# Patient Record
Sex: FEMALE | Race: WHITE | NOT HISPANIC OR LATINO | Employment: UNEMPLOYED | URBAN - METROPOLITAN AREA
[De-identification: names, ages, dates, MRNs, and addresses within clinical notes are randomized per-mention and may not be internally consistent; named-entity substitution may affect disease eponyms.]

---

## 2017-06-22 ENCOUNTER — TELEPHONE (OUTPATIENT)
Dept: INTERNAL MEDICINE | Facility: CLINIC | Age: 57
End: 2017-06-22

## 2017-06-24 ENCOUNTER — HEALTH MAINTENANCE LETTER (OUTPATIENT)
Age: 57
End: 2017-06-24

## 2017-10-03 ENCOUNTER — OFFICE VISIT (OUTPATIENT)
Dept: OBGYN | Facility: CLINIC | Age: 57
End: 2017-10-03
Payer: COMMERCIAL

## 2017-10-03 VITALS
HEIGHT: 67 IN | DIASTOLIC BLOOD PRESSURE: 80 MMHG | BODY MASS INDEX: 26.53 KG/M2 | SYSTOLIC BLOOD PRESSURE: 102 MMHG | WEIGHT: 169 LBS

## 2017-10-03 DIAGNOSIS — Z12.4 PAP SMEAR FOR CERVICAL CANCER SCREENING: ICD-10-CM

## 2017-10-03 DIAGNOSIS — Z11.51 SCREENING FOR HUMAN PAPILLOMAVIRUS: ICD-10-CM

## 2017-10-03 DIAGNOSIS — N93.9 VAGINAL SPOTTING: ICD-10-CM

## 2017-10-03 DIAGNOSIS — Z23 NEED FOR PROPHYLACTIC VACCINATION AND INOCULATION AGAINST INFLUENZA: ICD-10-CM

## 2017-10-03 DIAGNOSIS — Z01.419 WOMEN'S ANNUAL ROUTINE GYNECOLOGICAL EXAMINATION: Primary | ICD-10-CM

## 2017-10-03 DIAGNOSIS — Z13.29 SCREENING FOR THYROID DISORDER: ICD-10-CM

## 2017-10-03 LAB — TSH SERPL DL<=0.005 MIU/L-ACNC: 1.13 MU/L (ref 0.4–4)

## 2017-10-03 PROCEDURE — 84443 ASSAY THYROID STIM HORMONE: CPT | Performed by: ADVANCED PRACTICE MIDWIFE

## 2017-10-03 PROCEDURE — 99213 OFFICE O/P EST LOW 20 MIN: CPT | Mod: 25 | Performed by: ADVANCED PRACTICE MIDWIFE

## 2017-10-03 PROCEDURE — 36415 COLL VENOUS BLD VENIPUNCTURE: CPT | Performed by: ADVANCED PRACTICE MIDWIFE

## 2017-10-03 PROCEDURE — G0145 SCR C/V CYTO,THINLAYER,RESCR: HCPCS | Performed by: ADVANCED PRACTICE MIDWIFE

## 2017-10-03 PROCEDURE — 90686 IIV4 VACC NO PRSV 0.5 ML IM: CPT | Performed by: ADVANCED PRACTICE MIDWIFE

## 2017-10-03 PROCEDURE — 99386 PREV VISIT NEW AGE 40-64: CPT | Mod: 25 | Performed by: ADVANCED PRACTICE MIDWIFE

## 2017-10-03 PROCEDURE — 87624 HPV HI-RISK TYP POOLED RSLT: CPT | Performed by: ADVANCED PRACTICE MIDWIFE

## 2017-10-03 PROCEDURE — 90471 IMMUNIZATION ADMIN: CPT | Performed by: ADVANCED PRACTICE MIDWIFE

## 2017-10-03 NOTE — MR AVS SNAPSHOT
After Visit Summary   10/3/2017    Afua Ruby    MRN: [id number]           Patient Information     Date Of Birth          1960        Visit Information        Provider Department      10/3/2017 11:15 AM Piper Cabral CNM [facility]        Today's Diagnoses     Women's annual routine gynecological examination    -  1    Pap smear for cervical cancer screening        Screening for human papillomavirus        Screening for thyroid disorder        Need for prophylactic vaccination and inoculation against influenza        Vaginal spotting          Care Instructions    Preventive Health Recommendations  Female Ages over age 50      Yearly exam:     See your health care provider every year in order to:    1.Review health changes.  2. Discuss preventive care.  3. Review your medicines if your provider has prescribed any      Get a Pap test every five years with co-testing for HPV (unless you have an abnormal result and your provider advises testing more often)       You do not need a Pap test if your uterus was removed (hysterectomy) and you have not had cancer      You should be tested each year for STIs (sexually transmitted diseases) if you are at risk    Have a mammogram every year     Have a colonoscopy at age 50, or have a yearly FIT test (stool test). These exams screen for colon cancer.  Screening typically occurs every 10 years or more often if you have a family history or significant risk factors    Have a cholesterol test every 3-5 years, or more often if advised    Have a diabetes test (fasting glucose) every three years. If you are at risk for diabetes, you should have this test more often       Screening for thyroid disease and vitamin D deficiency are also beneficial every 3-5 years and as needed      If you are at risk for osteoporosis (brittle bone disease), think about having a bone density scan (DEXA)      You may experience perimenopausal symptoms such  as menstrual changes, hot flashes, night sweats, irritability, mood changes, vaginal dryness, and weight gain. Symptoms can be managed with lifestyle changes and/or medications for hormone replacement therapy (HRT). Talk with your provider about HRT if you are interested. You are considered menopausal after one year without having a menstrual cycle.        Shots:     Get a flu shot each year. Get a tetanus shot every 10 years.          Nutrition:       Eat at least 5 servings of fruits and vegetables each day      Eat REAL food, stay away from processed food      Eat whole-grain bread, whole-wheat pasta and brown rice instead of white grains and rice      For bone health:  Eat calcium-rich foods or take calcium pills up to 1200 mg. Also take vitamin D (2000 IUs) each day.     Lifestyle      Exercise at least 30 minutes a day, 5 days a week. This will help you control your weight and prevent disease.      Include weight bearing exercise into your exercise routine to help decrease your risk for osteoporosis      Limit alcohol to one drink per day.      No smoking      Wear sunscreen to prevent skin cancer      See your dentist every six months to one year for an exam and cleaning      See your eye doctor every 1 to 2 years            Follow-ups after your visit        Follow-up notes from your care team     Return in about 1 year (around 10/3/2018) for Physical Exam.      Future tests that were ordered for you today     Open Future Orders        Priority Expected Expires Ordered    US Pelvic Complete w Transvaginal Routine  10/3/2018 10/3/2017            Who to contact     If you have questions or need follow up information about today's clinic visit or your schedule please contact [facility] directly at [phone].  Normal or non-critical lab and imaging results will be communicated to you by MyChart, letter or phone within 4 business days after the clinic has received the results. If you  "do not hear from us within 7 days, please contact the clinic through [facility] or phone. If you have a critical or abnormal lab result, we will notify you by phone as soon as possible.  Submit refill requests through [facility] or call your pharmacy and they will forward the refill request to us. Please allow 3 business days for your refill to be completed.          Additional Information About Your Visit        [employer] Information     [facility] gives you secure access to your electronic health record. If you see a primary care provider, you can also send messages to your care team and make appointments. If you have questions, please call your primary care clinic.  If you do not have a primary care provider, please call [phone] and they will assist you.        Care EveryWhere ID     This is your Care EveryWhere ID. This could be used by other organizations to access your [place] medical records  [id number]        Your Vitals Were     Height Last Period BMI (Body Mass Index)             5' 7\" (1.702 m) 06/25/2007 26.47 kg/m2          Blood Pressure from Last 3 Encounters:   10/03/17 102/80   08/23/16 106/68   10/02/14 102/68    Weight from Last 3 Encounters:   10/03/17 169 lb (76.7 kg)   08/23/16 162 lb 1.6 oz (73.5 kg)   10/02/14 147 lb (66.7 kg)              We Performed the Following     FLU VAC, SPLIT VIRUS IM > 3 YO (QUADRIVALENT) [10460]     HPV High Risk Types DNA Cervical     PAP imaged thin layer screen     TSH with free T4 reflex     Vaccine Administration, Initial [[id number]]          Today's Medication Changes          These changes are accurate as of: 10/3/17 11:50 AM.  If you have any questions, ask your nurse or doctor.               Stop taking these medicines if you haven't already. Please contact your care team if you have questions.     ondansetron 4 MG tablet   Commonly known as:  ZOFRAN   Stopped by:  Piper Cabral CNM                    Primary Care Provider Office Phone # Fax #    Reji TREVINO " MD Nikki [phone]       [address] [facility] [address]        Equal Access to Services     FABIAN WHITEHEAD : Hadii aad ku hadmarkycarmen Sherrygricelda, yuegerda husseinarmaniha, yair karadha duffy, [place] kurtin hayaajarrell salasdustin madsen andrzej mcclendon. So [facility] [phone].    ATENCIÓN: Si habla español, tiene a montanez disposición servicios gratuitos de asistencia lingüística. Llame al [phone].    We comply with applicable federal civil rights laws and [place] laws. We do not discriminate on the basis of race, color, national origin, age, disability, sex, sexual orientation, or gender identity.            Thank you!     Thank you for choosing [facility]  for your care. Our goal is always to provide you with excellent care. Hearing back from our patients is one way we can continue to improve our services. Please take a few minutes to complete the written survey that you may receive in the mail after your visit with us. Thank you!             Your Updated Medication List - Protect others around you: Learn how to safely use, store and throw away your medicines at www.disposemymeds.org.          This list is accurate as of: 10/3/17 11:50 AM.  Always use your most recent med list.                   Brand Name Dispense Instructions for use Diagnosis    nicotine polacrilex 2 MG gum    [employer] NICOTINE POLACRILEX    240 tablet    Place 1 each (2 mg) inside cheek as needed for smoking cessation As directed by physician instructions    Tobacco use disorder

## 2017-10-03 NOTE — PROGRESS NOTES
Afua is a 57 year old  female who presents for annual exam.     Besides routine health maintenance,  she would like to discuss one episode of light pink spotting ~ 2 weeks ago.  HPI:  Afua presents today for her annual exam. She has no concerns other than one episode of light pink spotting when she wiped about two weeks ago. She has not had any vaginal bleeding or spotting since her LMP of 2007  Patient's last menstrual period was 2007..     REPRODUCTIVE/GYNECOLOGIC HISTORY:  Afua is not sexually active at the current time  STI testing offered?  Declined  History of abnormal Pap smear:  No  SOCIAL HISTORY  Abuse: does not report having previously been physical or sexually abused.    Do you feel safe in your environment? YES    She  reports that she quit smoking about 3 years ago. Her smoking use included Cigarettes. She has a 20.00 pack-year smoking history. She has never used smokeless tobacco.      Last PHQ-9 score on record = No flowsheet data found.  Last GAD7 score on record = No flowsheet data found.  Alcohol Score = socially    HEALTH MAINTENANCE:  Cholesterol: (  Cholesterol   Date Value Ref Range Status   2013 140 0 - 200 mg/dL Final     Comment:     LDL Cholesterol is the primary guide to therapy.   The NCEP recommends further evaluation of: patients with cholesterol greater   than 200 mg/dL if additional risk factors are present, cholesterol greater   than   240 mg/dL, triglycerides greater than 150 mg/dL, or HDL less than 40 mg/dL.   2011 137 0 - 200 mg/dL Final     Comment:     LDL Cholesterol is the primary guide to therapy.   The NCEP recommends further evaluation of: patients with cholesterol greater   than 200 mg/dL if additional risk factors are present, cholesterol greater   than   240 mg/dL, triglycerides greater than 150 mg/dL, or HDL less than 40 mg/dL.    History of abnormal lipids: No  Mammo: 2016 . History of abnormal Mammo: No, .  Regular Self  Breast Exams: No  TSH: (No results found for: TSH )  Pap; (  Lab Results   Component Value Date    PAP NIL 10/02/2013    PAP ATYP 2007    )  Immunizations up to date: yes  (Gardasil, Tdap, Flu)  Health maintenance updated:  yes    HEALTHY HABITS  Eating habits: follows a balanced nutrition diet  Calcium/Vitamin D intake: source:  dairy Adequate? 2-3 servings/day  Exercise: How often do you exercise? 3-5 times/week;Cardio  Have you had an eye exam in the last two years? NO (Recommended)  Do you routinely see the dentist once or twice yearly? YES      HISTORY:  Obstetric History       T2      L2     SAB0   TAB0   Ectopic0   Multiple0   Live Births0       # Outcome Date GA Lbr Quentin/2nd Weight Sex Delivery Anes PTL Lv   2 Term            1 Term                 Past Medical History:   Diagnosis Date     Iron deficiency anemia     due to menorrhagia     Menorrhagia      Tobacco abuse      Past Surgical History:   Procedure Laterality Date     NO HISTORY OF SURGERY      oral for molars     Family History   Problem Relation Age of Onset     Adopted: Yes     Unknown/Adopted Mother      adopted     Unknown/Adopted Father      adopted     Social History     Social History     Marital status:      Spouse name: N/A     Number of children: 2     Years of education: N/A     Occupational History      [employer] [place]     Social History Main Topics     Smoking status: Former Smoker     Packs/day: 0.80     Years: 25.00     Types: Cigarettes     Quit date: 1/15/2014     Smokeless tobacco: Never Used     Alcohol use 1.8 oz/week     3 Standard drinks or equivalent per week      Comment: social     Drug use: No     Sexual activity: No     Other Topics Concern     None     Social History Narrative       Current Outpatient Prescriptions:      nicotine polacrilex ([employer] NICOTINE POLACRILEX) 2 MG gum, Place 1 each (2 mg) inside cheek as needed for smoking cessation As directed by physician instructions,  "Disp: 240 tablet, Rfl: 8     Allergies   Allergen Reactions     Nkda [No Known Drug Allergies]        Past medical, surgical, social and family history were reviewed and updated in EPIC.    ROS:   C: NEGATIVE for fever, chills, change in weight  I: NEGATIVE for worrisome rashes, moles or lesions  E: NEGATIVE for vision changes or irritation  ENT: NEGATIVE for ear, mouth and throat problems  R: NEGATIVE for significant cough or SOB  B: NEGATIVE for masses, tenderness or discharge  CV: NEGATIVE for chest pain, palpitations or peripheral edema  GI: NEGATIVE for nausea, abdominal pain, heartburn, or change in bowel habits  : NEGATIVE for unusual urinary or vaginal symptoms. No vaginal bleeding.        Light pink spotting ~ 2 weeks ago x 1  M: NEGATIVE for significant arthralgias or myalgia  N: NEGATIVE for weakness, dizziness or paresthesias  E: NEGATIVE for temperature intolerance, skin/hair changes  H: NEGATIVE for bleeding problems  P: NEGATIVE for changes in mood or affect     PHYSICAL EXAM:  /80 (BP Location: Right arm, Patient Position: Chair, Cuff Size: Adult Regular)  Ht 5' 7\" (1.702 m)  Wt 169 lb (76.7 kg)  LMP 06/25/2007  BMI 26.47 kg/m2   BMI: Body mass index is 26.47 kg/(m^2).  Constitutional: healthy, alert and no distress  Neck: symmetrical, thyroid normal size, no masses present, no lymphadenopathy present.   Cardiovascular: RRR, no murmurs present  Respiratory: breathing unlabored, lungs CTA bilaterally  Breast:normal without masses, tenderness or nipple discharge and no palpable axillary masses or adenopathy  Gastrointestinal: abdomen soft, non-tender, bowel sounds present  PELVIC EXAM:  Vulva: No lesions, no adenopathy, BUS WNL  Vagina:  Dry, erythremic , discharge normal  well rugated, no lesions  Cervix:smooth, pink, no visible lesions  Uterus: Normal size, non-tender, mobile  Ovaries: No masses palpated  Rectal exam: deferred    ASSESSMENT/PLAN:      (Z01.419) Women's annual routine " gynecological examination  (primary encounter diagnosis)  Comment: Exam normal and appropriate for age  Plan: FLU VAC, SPLIT VIRUS IM > 3 YO (QUADRIVALENT)         [17893], Vaccine Administration, Initial         [69981]        Next annual exam due in one year.    (Z12.4) Pap smear for cervical cancer screening  Comment: Pending  Plan: PAP imaged thin layer screen, FLU VAC, SPLIT         VIRUS IM > 3 YO (QUADRIVALENT) [32355], Vaccine        Administration, Initial [28442]        Will advise patient of result when available. If normal next pap due in five years.    (Z11.51) Screening for human papillomavirus  Comment: pending  Plan: HPV High Risk Types DNA Cervical, FLU VAC,         SPLIT VIRUS IM > 3 YO (QUADRIVALENT) [40899],         Vaccine Administration, Initial [17472]        Will advise patient of result when available. If normal next HPV due in five years.        (Z13.29) Screening for thyroid disorder  Comment: pending  Plan: TSH with free T4 reflex, FLU VAC, SPLIT VIRUS         IM > 3 YO (QUADRIVALENT) [03170], Vaccine         Administration, Initial [76677]        Will advise patient of result when available. If normal next TSH due in five years.        (Z23) Need for prophylactic vaccination and inoculation against influenza  Comment: given  Plan: FLU VAC, SPLIT VIRUS IM > 3 YO (QUADRIVALENT)         [62357], Vaccine Administration, Initial         [65626]            (N92.0) Vaginal spotting  Comment: Most likely due to erosion of thin vaginal walls, but cannot rule out other pathology. Ultrasound ordered to assess endometrial lining   Plan: US Pelvic Complete w Transvaginal        Will advise patient of result when available. If normal will advise patient to continue to monitor and call if she has any further vaginal bleeding.        Results for orders placed or performed in visit on 10/28/16   *MA Screening Digital Bilateral    Narrative    SCREENING MAMMOGRAM, BILATERAL, DIGITAL w/CAD - 10/28/2016  11:05 AM    BREAST SYMPTOMS: No current breast complaints.     COMPARISON:  10/02/2015, 10/09/2014, 09/09/2013.    BREAST DENSITY: Scattered fibroglandular densities.    COMMENTS: No findings of suspicion for malignancy.       Impression    IMPRESSION: BI-RADS CATEGORY: 1 -  Negative    RECOMMENDED FOLLOW-UP: Annual Mammography.    Exam results letter mailed to patient.      MYKEL DUKE MD         COUNSELING:   Reviewed preventive health counseling, as reflected in patient instructions       Regular exercise       Healthy diet/nutrition   reports that she quit smoking about 3 years ago. Her smoking use included Cigarettes. She has a 20.00 pack-year smoking history. She has never used smokeless tobacco.     JERICA Willis, JERICA Yang, JOVANI

## 2017-10-03 NOTE — PROGRESS NOTES
Injectable Influenza Immunization Documentation    1.  Is the person to be vaccinated sick today?   No    2. Does the person to be vaccinated have an allergy to a component   of the vaccine?   No    3. Has the person to be vaccinated ever had a serious reaction   to influenza vaccine in the past?   No    4. Has the person to be vaccinated ever had Guillain-Barré syndrome?   No    Form completed by MOSHE Da Silva LPN

## 2017-10-03 NOTE — NURSING NOTE
"Chief Complaint   Patient presents with     Gyn Exam     pap is due       Initial /80 (BP Location: Right arm, Patient Position: Chair, Cuff Size: Adult Regular)  Ht 5' 7\" (1.702 m)  Wt 169 lb (76.7 kg)  LMP 2007  BMI 26.47 kg/m2 Estimated body mass index is 26.47 kg/(m^2) as calculated from the following:    Height as of this encounter: 5' 7\" (1.702 m).    Weight as of this encounter: 169 lb (76.7 kg).  BP completed using cuff size: regular        The following HM Due: pap smear  Lab Results   Component Value Date    PAP NIL 10/02/2013         The following patient reported/Care Every where data was sent to:  P ABSTRACT QUALITY INITIATIVES [82643]       patient has appointment for today             "

## 2017-10-05 LAB
COPATH REPORT: NORMAL
PAP: NORMAL

## 2017-10-09 LAB
FINAL DIAGNOSIS: NORMAL
HPV HR 12 DNA CVX QL NAA+PROBE: NEGATIVE
HPV16 DNA SPEC QL NAA+PROBE: NEGATIVE
HPV18 DNA SPEC QL NAA+PROBE: NEGATIVE
SPECIMEN DESCRIPTION: NORMAL

## 2017-10-25 ENCOUNTER — OFFICE VISIT (OUTPATIENT)
Dept: URGENT CARE | Facility: URGENT CARE | Age: 57
End: 2017-10-25
Payer: COMMERCIAL

## 2017-10-25 VITALS
HEART RATE: 76 BPM | SYSTOLIC BLOOD PRESSURE: 119 MMHG | TEMPERATURE: 97.3 F | WEIGHT: 170.31 LBS | BODY MASS INDEX: 26.67 KG/M2 | DIASTOLIC BLOOD PRESSURE: 79 MMHG

## 2017-10-25 DIAGNOSIS — R30.0 DYSURIA: Primary | ICD-10-CM

## 2017-10-25 DIAGNOSIS — N30.01 ACUTE CYSTITIS WITH HEMATURIA: ICD-10-CM

## 2017-10-25 LAB
ALBUMIN UR-MCNC: NEGATIVE MG/DL
APPEARANCE UR: CLEAR
BILIRUB UR QL STRIP: NEGATIVE
COLOR UR AUTO: YELLOW
GLUCOSE UR STRIP-MCNC: NEGATIVE MG/DL
HGB UR QL STRIP: ABNORMAL
KETONES UR STRIP-MCNC: NEGATIVE MG/DL
LEUKOCYTE ESTERASE UR QL STRIP: ABNORMAL
NITRATE UR QL: NEGATIVE
PH UR STRIP: 5.5 PH (ref 5–7)
RBC #/AREA URNS AUTO: >100 /HPF
SOURCE: ABNORMAL
SP GR UR STRIP: 1.01 (ref 1–1.03)
UROBILINOGEN UR STRIP-ACNC: 0.2 EU/DL (ref 0.2–1)
WBC #/AREA URNS AUTO: ABNORMAL /HPF

## 2017-10-25 PROCEDURE — 99213 OFFICE O/P EST LOW 20 MIN: CPT | Performed by: PHYSICIAN ASSISTANT

## 2017-10-25 PROCEDURE — 81001 URINALYSIS AUTO W/SCOPE: CPT | Performed by: PHYSICIAN ASSISTANT

## 2017-10-25 RX ORDER — NITROFURANTOIN 25; 75 MG/1; MG/1
100 CAPSULE ORAL 2 TIMES DAILY
Qty: 14 CAPSULE | Refills: 0 | Status: SHIPPED | OUTPATIENT
Start: 2017-10-25 | End: 2018-07-26

## 2017-10-25 NOTE — PATIENT INSTRUCTIONS
1. Start antibiotic today.  2. Increase fluids to help flush urinary tract.    3. Take ibuprofen (600mg every 6 hours with food or water) or tylenol (500mg every 6 hours) for pain.   4. OTC urinary pain reliever Azo/Uristat can be used to prevent discomfort. It changes the color of the urine (usually to orange or red) and may stain fabric. Do not use for longer than 48 hours since symptoms should have cleared by this time once antibiotics have been started.  If you are still having severe symptoms, you need to follow up with the clinic.  5. When culture results return, we will call and change your medication if needed.  Follow up immediately if you start to have high fever, severe back pain, nausea or vomiting.  To prevent future infections:  Drink plenty of water.   Do not delay urinating when you feel the need to urinate.   Keep the vaginal area clean. Wipe from front to back after using the toilet. Be sure to gently wash the genital area each time you bathe or shower. However, use soap only on the outside of your vagina. The chemicals in soap may cause additional irritation.   Urinate after intercourse. Never combine anal and vaginal intercourse.   Wear cotton underwear, which allows better air circulation than nylon. Wear pantyhose with cotton crotches.   Avoid tight clothes in the genital area, such as control-top pantyhose and tight jeans. Do not wear a wet bathing suit for long periods of time.

## 2017-10-25 NOTE — PROGRESS NOTES
SUBJECTIVE:   Afua Ruby is a 57 year old female who presents to clinic today for the following health issues:      URINARY TRACT SYMPTOMS  Onset: yesterday    Description:   Painful urination (Dysuria): YES  Blood in urine (Hematuria): YES  Delay in urine (Hesitency): no     Intensity: moderate    Progression of Symptoms:  worsening    Accompanying Signs & Symptoms:  Fever/chills: no   Flank pain no   Nausea and vomiting: no   Any vaginal symptoms: none  Abdominal/Pelvic Pain: no     History:   History of frequent UTI's: no   History of kidney stones: no   Sexually Active: YES  Possibility of pregnancy: No    Precipitating factors:   none    Therapies Tried and outcome: Cranberry juice seemed to help      Problem list and histories reviewed & adjusted, as indicated.  Additional history: as documented    Patient Active Problem List   Diagnosis     CARDIOVASCULAR SCREENING; LDL GOAL LESS THAN 130     Past Surgical History:   Procedure Laterality Date     NO HISTORY OF SURGERY      oral for molars       Social History   Substance Use Topics     Smoking status: Former Smoker     Packs/day: 0.80     Years: 25.00     Types: Cigarettes     Quit date: 1/15/2014     Smokeless tobacco: Never Used     Alcohol use 1.8 oz/week     3 Standard drinks or equivalent per week      Comment: social     Family History   Problem Relation Age of Onset     Adopted: Yes     Unknown/Adopted Mother      adopted     Unknown/Adopted Father      adopted         Current Outpatient Prescriptions   Medication Sig Dispense Refill     nitroFURantoin, macrocrystal-monohydrate, (MACROBID) 100 MG capsule Take 1 capsule (100 mg) by mouth 2 times daily 14 capsule 0     nicotine polacrilex ([employer] NICOTINE POLACRILEX) 2 MG gum Place 1 each (2 mg) inside cheek as needed for smoking cessation As directed by physician instructions 240 tablet 8         Reviewed and updated as needed this visit by clinical staffTobacco  Allergies  Meds       Reviewed and  updated as needed this visit by Provider         ROS:  Constitutional, HEENT, cardiovascular, pulmonary, gi and gu systems are negative, except as otherwise noted.      OBJECTIVE:   /79  Pulse 76  Temp 97.3  F (36.3  C) (Oral)  Wt 170 lb 5 oz (77.3 kg)  LMP 06/25/2007  Breastfeeding? No  BMI 26.67 kg/m2  Body mass index is 26.67 kg/(m^2).  GENERAL: healthy, alert and no distress  HENT: oral mucous membranes moist  RESP: lungs clear to auscultation - no rales, rhonchi or wheezes  CV: regular rate and rhythm, normal S1 S2, no S3 or S4, no murmur, click or rub, no peripheral edema and peripheral pulses strong  ABDOMEN: soft, nontender, no hepatosplenomegaly, no masses and bowel sounds normal  SKIN: no suspicious lesions or rashes  BACK: no CVA tenderness, no paralumbar tenderness  PSYCH: mentation appears normal, affect normal/bright    Diagnostic Test Results:  Results for orders placed or performed in visit on 10/25/17 (from the past 24 hour(s))   UA with Microscopic reflex to Culture   Result Value Ref Range    Color Urine Yellow     Appearance Urine Clear     Glucose Urine Negative NEG^Negative mg/dL    Bilirubin Urine Negative NEG^Negative    Ketones Urine Negative NEG^Negative mg/dL    Specific Gravity Urine 1.015 1.003 - 1.035    pH Urine 5.5 5.0 - 7.0 pH    Protein Albumin Urine Negative NEG^Negative mg/dL    Urobilinogen Urine 0.2 0.2 - 1.0 EU/dL    Nitrite Urine Negative NEG^Negative    Blood Urine Large (A) NEG^Negative    Leukocyte Esterase Urine Trace (A) NEG^Negative    Source Midstream Urine     WBC Urine 2-5 (A) OTO2^O - 2 /HPF    RBC Urine >100 (A) OTO2^O - 2 /HPF       ASSESSMENT/PLAN:         ICD-10-CM    1. Dysuria R30.0 UA with Microscopic reflex to Culture   2. Acute cystitis with hematuria N30.01 nitroFURantoin, macrocrystal-monohydrate, (MACROBID) 100 MG capsule     Discussed medication appropriate use, potential side effects and anticipated benefit  Of use of nitrofurantoin.   F/u  with pcp if not improved as anticipated.     Patient Instructions   1. Start antibiotic today.  2. Increase fluids to help flush urinary tract.    3. Take ibuprofen (600mg every 6 hours with food or water) or tylenol (500mg every 6 hours) for pain.   4. OTC urinary pain reliever Azo/Uristat can be used to prevent discomfort. It changes the color of the urine (usually to orange or red) and may stain fabric. Do not use for longer than 48 hours since symptoms should have cleared by this time once antibiotics have been started.  If you are still having severe symptoms, you need to follow up with the clinic.  5. When culture results return, we will call and change your medication if needed.  Follow up immediately if you start to have high fever, severe back pain, nausea or vomiting.  To prevent future infections:  Drink plenty of water.   Do not delay urinating when you feel the need to urinate.   Keep the vaginal area clean. Wipe from front to back after using the toilet. Be sure to gently wash the genital area each time you bathe or shower. However, use soap only on the outside of your vagina. The chemicals in soap may cause additional irritation.   Urinate after intercourse. Never combine anal and vaginal intercourse.   Wear cotton underwear, which allows better air circulation than nylon. Wear pantyhose with cotton crotches.   Avoid tight clothes in the genital area, such as control-top pantyhose and tight jeans. Do not wear a wet bathing suit for long periods of time.             Nicole Joy Siegler, PA-C  [facility]

## 2017-10-25 NOTE — NURSING NOTE
"Chief Complaint   Patient presents with     UTI     burning pain when urinating, blood in urine and urinary frequency for two days.        Initial /79  Pulse 76  Temp 97.3  F (36.3  C) (Oral)  Wt 170 lb 5 oz (77.3 kg)  LMP 06/25/2007  Breastfeeding? No  BMI 26.67 kg/m2 Estimated body mass index is 26.67 kg/(m^2) as calculated from the following:    Height as of 10/3/17: 5' 7\" (1.702 m).    Weight as of this encounter: 170 lb 5 oz (77.3 kg).  Medication Reconciliation: complete    "

## 2017-10-25 NOTE — MR AVS SNAPSHOT
After Visit Summary   10/25/2017    Afua Ruby    MRN: [id number]           Patient Information     Date Of Birth          1960        Visit Information        Provider Department      10/25/2017 5:30 PM Siegler, Nicole Joy, PA-C [facility]        Today's Diagnoses     Dysuria    -  1      Care Instructions    1. Start antibiotic today.  2. Increase fluids to help flush urinary tract.    3. Take ibuprofen (600mg every 6 hours with food or water) or tylenol (500mg every 6 hours) for pain.   4. OTC urinary pain reliever Azo/Uristat can be used to prevent discomfort. It changes the color of the urine (usually to orange or red) and may stain fabric. Do not use for longer than 48 hours since symptoms should have cleared by this time once antibiotics have been started.  If you are still having severe symptoms, you need to follow up with the clinic.  5. When culture results return, we will call and change your medication if needed.  Follow up immediately if you start to have high fever, severe back pain, nausea or vomiting.  To prevent future infections:  Drink plenty of water.   Do not delay urinating when you feel the need to urinate.   Keep the vaginal area clean. Wipe from front to back after using the toilet. Be sure to gently wash the genital area each time you bathe or shower. However, use soap only on the outside of your vagina. The chemicals in soap may cause additional irritation.   Urinate after intercourse. Never combine anal and vaginal intercourse.   Wear cotton underwear, which allows better air circulation than nylon. Wear pantyhose with cotton crotches.   Avoid tight clothes in the genital area, such as control-top pantyhose and tight jeans. Do not wear a wet bathing suit for long periods of time.                 Follow-ups after your visit        Your next 10 appointments already scheduled     Nov 02, 2017 10:30 AM CDT   US PELVIC COMPLETE W TRANSVAGINAL  with OXUS1   [facility]    [address] [address] [phone]           Please bring a list of your medicines (including vitamins, minerals and over-the-counter drugs). Also, tell your doctor about any allergies you may have. Wear comfortable clothes and leave your valuables at home.  Adults: Drink six 8-ounce glasses of fluid one hour before your exam. Do NOT empty your bladder.  If you need to empty your bladder before your exam, try to release only a little bit of urine. Then, drink another 8oz glass of fluid.  Children: Children who are potty trained should drink at least 4 cups (32 oz) of liquid 45 minutes to one hour prior to the exam. The child s bladder must be full in order to achieve a diagnostic exam. If your child is very uncomfortable or has an urgent need to pee, please notify a technologist; they will try to find out how much longer the wait may be and provide instructions to help relieve the pressure. Occasionally it is medically necessary to insert a urinary catheter to fill the bladder.  Please call the Imaging Department at your exam site with any questions.              Who to contact     If you have questions or need follow up information about today's clinic visit or your schedule please contact [facility] directly at [phone].  Normal or non-critical lab and imaging results will be communicated to you by MyChart, letter or phone within 4 business days after the clinic has received the results. If you do not hear from us within 7 days, please contact the clinic through MyChart or phone. If you have a critical or abnormal lab result, we will notify you by phone as soon as possible.  Submit refill requests through [employer] or call your pharmacy and they will forward the refill request to us. Please allow 3 business days for your refill to be completed.          Additional  Information About Your Visit        [employer] Information     [employer] gives you secure access to your electronic health record. If you see a primary care provider, you can also send messages to your care team and make appointments. If you have questions, please call your primary care clinic.  If you do not have a primary care provider, please call [phone] and they will assist you.        Care EveryWhere ID     This is your Care EveryWhere ID. This could be used by other organizations to access your [facility] medical records  [id number]        Your Vitals Were     Pulse Temperature Last Period Breastfeeding? BMI (Body Mass Index)       76 97.3  F (36.3  C) (Oral) 06/25/2007 No 26.67 kg/m2        Blood Pressure from Last 3 Encounters:   10/25/17 119/79   10/03/17 102/80   08/23/16 106/68    Weight from Last 3 Encounters:   10/25/17 170 lb 5 oz (77.3 kg)   10/03/17 169 lb (76.7 kg)   08/23/16 162 lb 1.6 oz (73.5 kg)              We Performed the Following     UA with Microscopic reflex to Culture        Primary Care Provider Office Phone # Fax #    Reji Jordan [phone]       [address] [facility] [address]        Equal Access to Services     FABIAN WHITEHEAD : Hadii monse ku hadasho Soomaali, waaxda luqadaha, qaybta kaalmada adeegyada, phoenix mcclendon. So [facility] [phone].    ATENCIÓN: Si habla español, tiene a montanez disposición servicios gratPathway Lendingos de asistencia lingüística. Llame al [phone].    We comply with applicable federal civil rights laws and [place] laws. We do not discriminate on the basis of race, color, national origin, age, disability, sex, sexual orientation, or gender identity.            Thank you!     Thank you for choosing [facility]  for your care. Our goal is always to provide you with excellent care. Hearing back from our patients is one way we can continue to improve our services. Please take a few  minutes to complete the written survey that you may receive in the mail after your visit with us. Thank you!             Your Updated Medication List - Protect others around you: Learn how to safely use, store and throw away your medicines at www.disposemymeds.org.          This list is accurate as of: 10/25/17  6:30 PM.  Always use your most recent med list.                   Brand Name Dispense Instructions for use Diagnosis    nicotine polacrilex 2 MG gum    [employer]S NICOTINE POLACRILEX    240 tablet    Place 1 each (2 mg) inside cheek as needed for smoking cessation As directed by physician instructions    Tobacco use disorder

## 2017-12-30 ENCOUNTER — HEALTH MAINTENANCE LETTER (OUTPATIENT)
Age: 57
End: 2017-12-30

## 2018-07-26 ENCOUNTER — OFFICE VISIT (OUTPATIENT)
Dept: INTERNAL MEDICINE | Facility: CLINIC | Age: 58
End: 2018-07-26
Payer: COMMERCIAL

## 2018-07-26 VITALS
TEMPERATURE: 98.1 F | HEIGHT: 67 IN | SYSTOLIC BLOOD PRESSURE: 114 MMHG | OXYGEN SATURATION: 98 % | DIASTOLIC BLOOD PRESSURE: 68 MMHG | WEIGHT: 169.7 LBS | HEART RATE: 87 BPM | BODY MASS INDEX: 26.63 KG/M2 | RESPIRATION RATE: 16 BRPM

## 2018-07-26 DIAGNOSIS — M54.50 ACUTE MIDLINE LOW BACK PAIN WITHOUT SCIATICA: Primary | ICD-10-CM

## 2018-07-26 PROCEDURE — 99213 OFFICE O/P EST LOW 20 MIN: CPT | Performed by: INTERNAL MEDICINE

## 2018-07-26 NOTE — PROGRESS NOTES
"  SUBJECTIVE:   Afua Ruby is a 58 year old female who presents to clinic today for the following health issues:    Back Pain       Duration: 5 days        Specific cause: does a lot of lifting     Description:   Location of pain: low back both  Character of pain: dull ache  Pain radiation:none  New numbness or weakness in legs, not attributed to pain:  no     Intensity: mild    History:   Pain interferes with job: Yes  History of back problems: no prior back problems  Any previous MRI or X-rays: None  Sees a specialist for back pain:  No  Therapies tried without relief: NSAIDs    Alleviating factors:   Improved by: NSAIDs      Precipitating factors:  Worsened by: Nothing    Functional and Psychosocial Screen (Raymundo STarT Back):      Not performed today          Accompanying Signs & Symptoms:  Risk of Fracture:  None  Risk of Cauda Equina:  None  Risk of Infection:  None  Risk of Cancer:  None  Risk of Ankylosing Spondylitis:  Onset at age <35, male, AND morning back stiffness. no       Problem list and histories reviewed & adjusted, as indicated.  Additional history: as documented    Labs reviewed in EPIC    Reviewed and updated as needed this visit by clinical staff  Allergies       Reviewed and updated as needed this visit by Provider         ROS:  Constitutional, HEENT, cardiovascular, pulmonary, gi and gu systems are negative, except as otherwise noted.    OBJECTIVE:                                                    /68  Pulse 87  Temp 98.1  F (36.7  C) (Oral)  Resp 16  Ht 5' 7\" (1.702 m)  Wt 169 lb 11.2 oz (77 kg)  LMP 06/25/2007  SpO2 98%  BMI 26.58 kg/m2  Body mass index is 26.58 kg/(m^2).  GENERAL APPEARANCE: healthy, alert and no distress  RESP: lungs clear to auscultation - no rales, rhonchi or wheezes  CV: regular rates and rhythm, normal S1 S2, no S3 or S4 and no murmur, click or rub  MS: extremities normal- no gross deformities noted  Comprehensive back pain exam:  No tenderness, " Range of motion not limited by pain, Lower extremity strength functional and equal on both sides, Lower extremity reflexes within normal limits bilaterally, Lower extremity sensation normal and equal on both sides and Straight leg raise negative bilaterally    Diagnostic test results:  none      ASSESSMENT/PLAN:                                                    1. Acute midline low back pain without sciatica  Appears musculoskeletal.  Related to lifting injury at work.  General she is improving significantly and we discussed avoidance of future injuries but little else is needed at this time.            Reji Jordan MD  [facility]

## 2018-07-26 NOTE — PATIENT INSTRUCTIONS
When You Have Low Back Pain    Caring for Your Back  You are not alone.    Low back pain is very common. Nearly half of all adults have low back pain in any given year. The good news is that back pain is rarely a danger to your health. Most people can manage their back pain on their own and about half of them start feeling better within 2 weeks. In 9 out of 10 cases, low back pain goes away or no longer limits daily activity within 6 weeks.     Your outlook is good!    Your symptoms tell us that your low back pain is most likely not a danger to you. Most of the time we do not know the exact cause of low back pain, even if you see a doctor or have an MRI. However, treatment can still work without knowing the cause of the pain. Less than 1 in 100 people need surgery for their back pain.     What can I do about my low back pain?     There are three things you can do to ease low back pain and help it go away.    Use heat or cold packs.    Take medicine as directed.    Use positions, movements and exercises.     Using heat or cold packs    Try cold packs or gentle heat to ease your pain. Use whichever gives you the most relief. Apply the cold pack or heat for 15 minutes at a time, as often as needed.    Taking medicine      If your doctor has prescribed medicine, be sure to follow the directions.    If you take over-the-counter medicine, read and follow the directions.    Talk to your doctor if you have any questions.     Using positions, movements and exercises    Research tells us that moving your joints and muscles can help you recover from back pain. Such activity should be simple and gentle. Use the positions in the photos as well as walking to help relieve your pain. Try taking a short walk every 3 to 4 hours during the day. Walk for a few minutes inside your home or take longer walks outside, on a treadmill or at a mall. Slowly increase the amount of time you walk. Expect discomfort when you begin, but it should  lessen as your back starts to heal. When your back feels better, walk daily to keep your back and body healthy.    Finding a comfortable position    When your back pain is new, certain positions will ease your pain. Gently try each of the positions below until you find one that is helpful. Once you find a position of comfort, use it as often as you like when you are resting. You will recover faster if you combine rest with activity.         Lie on your back with your legs bent. You can do this by placing a pillow under your knees. Or you may lie on the floor and rest your lower legs on the seat of a chair.       Lie on your side with your knees bent, and place a pillow between your knees.       Lie on your stomach over pillows.      When should I call my doctor?    Your back pain should improve over the first couple of weeks. As it improves, you should be able to return to your normal activities. But call your doctor if:    You have a sudden change in your ability to control your bladder or bowels.    You feel tingling in your groin or legs.    The pain spreads down your leg and into your foot.    Your toes, feet or leg muscles feel weak.    You feel generally unwell or sick.    Your pain does not get better or gets worse.      If you are deaf or hard of hearing, please let us know. We provide many free services including sign language interpreters,oral interpreters, TTYs, telephone amplifiers, note takers and written materials.    For informational purposes only. Not to replace the advice of your health care provider. Copyright   2013 [facility]. All rights reserved. [employer] [id number] - Rev 06/14.

## 2018-07-26 NOTE — MR AVS SNAPSHOT
After Visit Summary   7/26/2018    Afua Ruby    MRN: [id number]           Patient Information     Date Of Birth          1960        Visit Information        Provider Department      7/26/2018 3:00 PM Reji Jordan MD [facility]        Today's Diagnoses     Acute midline low back pain without sciatica    -  1      Care Instructions    When You Have Low Back Pain    Caring for Your Back  You are not alone.    Low back pain is very common. Nearly half of all adults have low back pain in any given year. The good news is that back pain is rarely a danger to your health. Most people can manage their back pain on their own and about half of them start feeling better within 2 weeks. In 9 out of 10 cases, low back pain goes away or no longer limits daily activity within 6 weeks.     Your outlook is good!    Your symptoms tell us that your low back pain is most likely not a danger to you. Most of the time we do not know the exact cause of low back pain, even if you see a doctor or have an MRI. However, treatment can still work without knowing the cause of the pain. Less than 1 in 100 people need surgery for their back pain.     What can I do about my low back pain?     There are three things you can do to ease low back pain and help it go away.    Use heat or cold packs.    Take medicine as directed.    Use positions, movements and exercises.     Using heat or cold packs    Try cold packs or gentle heat to ease your pain. Use whichever gives you the most relief. Apply the cold pack or heat for 15 minutes at a time, as often as needed.    Taking medicine      If your doctor has prescribed medicine, be sure to follow the directions.    If you take over-the-counter medicine, read and follow the directions.    Talk to your doctor if you have any questions.     Using positions, movements and exercises    Research tells us that moving your joints and muscles can help you  recover from back pain. Such activity should be simple and gentle. Use the positions in the photos as well as walking to help relieve your pain. Try taking a short walk every 3 to 4 hours during the day. Walk for a few minutes inside your home or take longer walks outside, on a treadmill or at a mall. Slowly increase the amount of time you walk. Expect discomfort when you begin, but it should lessen as your back starts to heal. When your back feels better, walk daily to keep your back and body healthy.    Finding a comfortable position    When your back pain is new, certain positions will ease your pain. Gently try each of the positions below until you find one that is helpful. Once you find a position of comfort, use it as often as you like when you are resting. You will recover faster if you combine rest with activity.         Lie on your back with your legs bent. You can do this by placing a pillow under your knees. Or you may lie on the floor and rest your lower legs on the seat of a chair.       Lie on your side with your knees bent, and place a pillow between your knees.       Lie on your stomach over pillows.      When should I call my doctor?    Your back pain should improve over the first couple of weeks. As it improves, you should be able to return to your normal activities. But call your doctor if:    You have a sudden change in your ability to control your bladder or bowels.    You feel tingling in your groin or legs.    The pain spreads down your leg and into your foot.    Your toes, feet or leg muscles feel weak.    You feel generally unwell or sick.    Your pain does not get better or gets worse.      If you are deaf or hard of hearing, please let us know. We provide many free services including sign language interpreters,oral interpreters, TTYs, telephone amplifiers, note takers and written materials.    For informational purposes only. Not to replace the advice of your health care provider.  "Copyright   2013 [facility]. All rights reserved. [employer] 622701 - Rev 06/14.            Follow-ups after your visit        Who to contact     If you have questions or need follow up information about today's clinic visit or your schedule please contact [facility] directly at [phone].  Normal or non-critical lab and imaging results will be communicated to you by MyChart, letter or phone within 4 business days after the clinic has received the results. If you do not hear from us within 7 days, please contact the clinic through [employer] or phone. If you have a critical or abnormal lab result, we will notify you by phone as soon as possible.  Submit refill requests through [facility] [employer] or call your pharmacy and they will forward the refill request to us. Please allow 3 business days for your refill to be completed.          Additional Information About Your Visit        MyChart Information     [facility] [employer] gives you secure access to your electronic health record. If you see a primary care provider, you can also send messages to your care team and make appointments. If you have questions, please call your primary care clinic.  If you do not have a primary care provider, please call [phone] and they will assist you.        Care EveryWhere ID     This is your Care EveryWhere ID. This could be used by other organizations to access your [place] medical records  [id number]        Your Vitals Were     Pulse Temperature Respirations Height Last Period Pulse Oximetry    87 98.1  F (36.7  C) (Oral) 16 5' 7\" (1.702 m) 06/25/2007 98%    BMI (Body Mass Index)                   26.58 kg/m2            Blood Pressure from Last 3 Encounters:   07/26/18 114/68   10/25/17 119/79   10/03/17 102/80    Weight from Last 3 Encounters:   07/26/18 169 lb 11.2 oz (77 kg)   10/25/17 170 lb 5 oz (77.3 kg)   10/03/17 169 lb (76.7 kg)              Today, you had the following     No orders found for " display       Primary Care Provider Office Phone # Fax #    Reji Jordan [phone]       [address] [facility] [address]        Equal Access to Services     AFBIAN WHITEHEAD : Sara alarcno marcuso Soomaali, waaxda luqadaha, qaybta kaalmada adeegyada, phoenix ennis josuedustin madsen andrzej mcclendon. So [facility] [phone].    ATENCIÓN: Si habla español, tiene a montanez disposición servicios gratuitos de asistencia lingüística. Llame al [phone].    We comply with applicable federal civil rights laws and [place] laws. We do not discriminate on the basis of race, color, national origin, age, disability, sex, sexual orientation, or gender identity.            Thank you!     Thank you for choosing [facility]  for your care. Our goal is always to provide you with excellent care. Hearing back from our patients is one way we can continue to improve our services. Please take a few minutes to complete the written survey that you may receive in the mail after your visit with us. Thank you!             Your Updated Medication List - Protect others around you: Learn how to safely use, store and throw away your medicines at www.disposemymeds.org.          This list is accurate as of 7/26/18  3:45 PM.  Always use your most recent med list.                   Brand Name Dispense Instructions for use Diagnosis    nicotine polacrilex 2 MG gum    [employer] NICOTINE POLACRILEX    240 tablet    Place 1 each (2 mg) inside cheek as needed for smoking cessation As directed by physician instructions    Tobacco use disorder

## 2018-09-19 ENCOUNTER — MYC MEDICAL ADVICE (OUTPATIENT)
Dept: INTERNAL MEDICINE | Facility: CLINIC | Age: 58
End: 2018-09-19

## 2018-09-28 ENCOUNTER — MYC MEDICAL ADVICE (OUTPATIENT)
Dept: INTERNAL MEDICINE | Facility: CLINIC | Age: 58
End: 2018-09-28

## 2018-11-10 ENCOUNTER — HEALTH MAINTENANCE LETTER (OUTPATIENT)
Age: 58
End: 2018-11-10

## 2019-02-15 ENCOUNTER — HEALTH MAINTENANCE LETTER (OUTPATIENT)
Age: 59
End: 2019-02-15

## 2019-04-15 ENCOUNTER — ALLIED HEALTH/NURSE VISIT (OUTPATIENT)
Dept: NURSING | Facility: CLINIC | Age: 59
End: 2019-04-15
Payer: COMMERCIAL

## 2019-04-15 DIAGNOSIS — Z23 NEED FOR SHINGLES VACCINE: Primary | ICD-10-CM

## 2019-04-15 PROCEDURE — 90471 IMMUNIZATION ADMIN: CPT

## 2019-04-15 PROCEDURE — 90750 HZV VACC RECOMBINANT IM: CPT

## 2019-04-15 NOTE — PROGRESS NOTES
Screening Questionnaire for Adult Immunization    Are you sick today?   No   Do you have allergies to medications, food, a vaccine component or latex?   No   Have you ever had a serious reaction after receiving a vaccination?   No   Do you have a long-term health problem with heart disease, lung disease, asthma, kidney disease, metabolic disease (e.g. diabetes), anemia, or other blood disorder?   No   Do you have cancer, leukemia, HIV/AIDS, or any other immune system problem?   No   In the past 3 months, have you taken medications that affect  your immune system, such as prednisone, other steroids, or anticancer drugs; drugs for the treatment of rheumatoid arthritis, Crohn s disease, or psoriasis; or have you had radiation treatments?   No   Have you had a seizure, or a brain or other nervous system problem?   No   During the past year, have you received a transfusion of blood or blood     products, or been given immune (gamma) globulin or antiviral drug?   No   For women: Are you pregnant or is there a chance you could become        pregnant during the next month?   No   Have you received any vaccinations in the past 4 weeks?   No     Immunization questionnaire answers were all negative.        Per orders of Dr. Jordan, injection of Shingrix given by Tracey Friend. Patient instructed to remain in clinic for 15 minutes afterwards, and to report any adverse reaction to me immediately.       Screening performed by Tracey Friend on 4/15/2019 at 1:04 PM.

## 2019-04-26 ENCOUNTER — TELEPHONE (OUTPATIENT)
Dept: INTERNAL MEDICINE | Facility: CLINIC | Age: 59
End: 2019-04-26

## 2019-04-26 DIAGNOSIS — Z12.31 VISIT FOR SCREENING MAMMOGRAM: Primary | ICD-10-CM

## 2019-04-26 NOTE — LETTER
[facility]  [address] [address]  [phone]  April 26, 2019    Afua Ruby  [address] [facility] [address]            Dear Afua,    We care about your health and based on a review of your medical records, recommend the the following, to better manage your health:      You are in particular need of attention regarding:  -Breast Cancer Screening  -Cervical Cancer Screening  -Wellness (Physical) Visit     I am recommending that you:     -schedule a WELLNESS (Physical) APPOINTMENT with me.   I will check fasting labs the same day - nothing to eat except water and meds for 8-10 hours prior.    -schedule a MAMMOGRAM which is due.    1 in 8 women will develop invasive breast cancer during her lifetime and it is the most common non-skin cancer in American women.  EARLY detection, new treatments, and a better understanding of the disease have increased survival rates - the 5 year survival rate in the 1960s was 63% and today it is close to 90%.    If you are under/uninsured, we recommend you contact the [facility]. They offer mammograms at no charge or on a sliding fee charge. You can schedule with them at [phone]. Please have them send us the results.      Please disregard this reminder if you have had this exam elsewhere within the last year.  It would be helpful for us to have a copy of your mammogram report in your file so that we can best coordinate your care - please contact us with when your test was done so we can update your record.             -schedule a PAP SMEAR EXAM which is due.  Please disregard this reminder if you have had this exam elsewhere within the last year.  It would be helpful for us to have a copy of your recent pap smear report in our file so that we can best coordinate your care.    If you are under/uninsured, we recommend you contact the [facility]. They offer pap smears at no charge or on a sliding  fee charge. You can schedule with them at [phone]. Please have them send us the results.      Here is a list of Health Maintenance topics that are due now or due soon:  Health Maintenance Due   Topic Date Due     HIV SCREEN (SYSTEM ASSIGNED)  01/11/1980     Hepatitis C Screening  01/11/1980     Discuss Advance Directive Planning  01/11/2017     Mammogram - yearly  10/28/2017     Cholesterol Lab - every 5 years  09/24/2018     Preventive Care Visit  10/03/2018     HPV test with Pap every year  10/03/2018     Pap Smear (diagnostic) - yearly  10/03/2018     PHQ-2  01/01/2019       Please call us at [phone] or [phone] (or use [employer]) to address the above recommendations.     Thank you for trusting [facility].  We appreciate the opportunity to serve you and look forward to supporting your healthcare needs in the future.    If you have (or plan to have) any of these tests done at a facility other than a [facility] or a [facility], please have the results from these tests sent to your primary physician at [facility].    Healthy Regards,    Reji Jordan MD

## 2019-04-26 NOTE — TELEPHONE ENCOUNTER
Panel Management Review      Patient has the following on her problem list: None      Composite cancer screening  Chart review shows that this patient is due/due soon for the following Pap Smear and Mammogram  Summary:    Patient is due/failing the following:   LDL, MAMMOGRAM, PAP and PHYSICAL    Action needed:   Patient needs office visit for physical. and Patient needs referral/order: Mammo    Type of outreach:    Sent Shortlist message. and Sent letter.    Questions for provider review:    Please sign order for mammo                                                                                                                                    Court Dorsey CMA       Chart routed to Provider .

## 2019-05-30 ENCOUNTER — ANCILLARY PROCEDURE (OUTPATIENT)
Dept: MAMMOGRAPHY | Facility: CLINIC | Age: 59
End: 2019-05-30
Attending: INTERNAL MEDICINE
Payer: COMMERCIAL

## 2019-05-30 DIAGNOSIS — Z12.31 VISIT FOR SCREENING MAMMOGRAM: ICD-10-CM

## 2019-05-30 PROCEDURE — 77067 SCR MAMMO BI INCL CAD: CPT | Mod: TC

## 2019-05-30 PROCEDURE — 77063 BREAST TOMOSYNTHESIS BI: CPT | Mod: TC

## 2019-06-26 ENCOUNTER — OFFICE VISIT (OUTPATIENT)
Dept: OBGYN | Facility: CLINIC | Age: 59
End: 2019-06-26
Payer: COMMERCIAL

## 2019-06-26 VITALS
SYSTOLIC BLOOD PRESSURE: 112 MMHG | WEIGHT: 174 LBS | DIASTOLIC BLOOD PRESSURE: 70 MMHG | HEIGHT: 67 IN | BODY MASS INDEX: 27.31 KG/M2

## 2019-06-26 DIAGNOSIS — Z00.00 WELLNESS EXAMINATION: Primary | ICD-10-CM

## 2019-06-26 PROCEDURE — 99396 PREV VISIT EST AGE 40-64: CPT | Performed by: OBSTETRICS & GYNECOLOGY

## 2019-06-26 ASSESSMENT — MIFFLIN-ST. JEOR: SCORE: 1406.89

## 2019-06-26 NOTE — PATIENT INSTRUCTIONS
Preventive Health Recommendations  Female Ages 40-64  Yearly exam:    See your health care provider every year in order to    Review health changes.      Discuss preventive care.       Review your medicines if you your doctor has prescribed any.    Pap Smears: You should have a Pap test every 3 years or have a Pap test with HPV screening every 5 years.    You do not need a Pap test if your uterus and cervix were removed (hysterectomy) and you have not had cancer.   Breast Cancer Screening: You should begin mammography for breast cancer screening by age 40 or 50 depending on your personal risks and your doctors recommendation. Screening should occur every year or every other year based on your discussion with your doctor.   [facility]: [phone]  Colorectal Cancer Screening: Starting at age 50 you need to undergo colonoscopy (every 5-10 years) or other colon cancer screening.    Health Maintenance:  - Your cholesterol should be checked every 5 years, more often if you have increased risk factors such as diabetes, high blood pressure, tobacco use, obesity, or a strong family history of cardiovascular disease before age 50 in men and 60 in women  - If you are at risk for diabetes due to being overweight or obese, having a strong family history, or having a history of gestational diabetes you should have a diabetes test (fasting glucose or Hemoglobin A1c level) every 3 years.  Shots: Get a flu shot each year. Get a tetanus shot every 10 years.    Nutrition:      Eat at least 5 servings of fruits and vegetables each day.    Eat whole-grain bread, whole-wheat pasta and brown rice instead of white grains and rice.    Consume 1000mg of Calcium and 1000u of Vitamin D daily. If these are not in your regular diet you should take a supplement.    To calculate the calcium in your food add a zero to the percent found on the packaging (ex: 30% = 300mg of calcium per serving)    Good sources of Calcium  include:    Low-fat dairy products (200 to 300 milligrams per serving)      Dark green leafy vegetables     Canned salmon or sardines with bones     Soy products, such as tofu     Calcium-fortified cereals and orange juice    Osteopenia is low bone mass, your risk increases once you reach menopause and your calcium needs then increase to 1,200mg daily    The [place] [employer] recommends that total calcium intake, from supplements and diet combined, should be no more than 2,000 milligrams daily for people older than 50.    Lifestyle    Get in at least 150 minutes of physical activity a week (30 minutes a day, 5 days of the week), of which about half should be vigorous exercise (jogging, swimming, lifting weights).  This will help you control your weight and prevent disease.    Limit alcohol to one drink per day.    No smoking.      Wear sunscreen to prevent skin cancer.    See your dentist every six months for an exam and cleaning        Menopause and Perimenopause    Hot flashes, night sweats, mood changes, sleep disturbances, changes in the menstrual periods, decreased interest in sex, vaginal dryness or painful sex, and changes in the skin and hair are all common symptoms of perimenopause (the period of time, lasting several years, leading up to menopause). Menopause is defined as 12 months without a menstrual period.     It might be helpful to make a list of your symptoms, and to rank them in order of how much they bother you, or which ones you would fix first if you could. This can help us decide what treatment options might be best for you. Treatment can include one or more of the following: lifestyle changes like diet and exercise, supplements, prescription medications for hormones, and other prescriptions that are nonhormonal. We will discuss these in more detail after your test results (if you are having lab work done) are available, when you come back. Some tests that might be done for women in  perimenopause include blood work for hormone levels and to check the thyroid or ultrasound or biopsy of the lining of the uterus (if you are having abnormal bleeding).

## 2019-06-26 NOTE — PROGRESS NOTES
Afua is a 59 year old  who presents for annual exam.     Besides routine health maintenance, she has no other health concerns today .  GYNECOLOGIC HISTORY:  Postmenopausal since age 53.  She is having no menopausal symptoms. No vaginal bleeding noted.   She is not sexually active.  History sexually transmitted infections:No STD history  Estrogen replacement therapy: No  Mammogram 2019: negative, Birads 1  History of abnormal Pap smear: No, Negative co-testing 10/2017  Family history of breast CA: Adopted   Family history of uterine/ovarian CA: Adopted   Family history of colon CA: Adopted     HEALTH MAINTENANCE:    Pap;   Lab Results   Component Value Date    PAP NIL 10/03/2017    PAP NIL 10/02/2013    PAP ATYP 2007    )    HISTORY:  OB History    Para Term  AB Living   2 2 2 0 0 2   SAB TAB Ectopic Multiple Live Births   0 0 0 0 2      # Outcome Date GA Lbr Quentin/2nd Weight Sex Delivery Anes PTL Lv   2 Term     F    ANSHU   1 Term 1985    M    ANSHU     Past Medical History:   Diagnosis Date     Iron deficiency anemia     due to menorrhagia     Menorrhagia      Tobacco abuse      Past Surgical History:   Procedure Laterality Date     NO HISTORY OF SURGERY      oral for molars     TUBAL LIGATION       Family History   Adopted: Yes   Problem Relation Age of Onset     Unknown/Adopted Mother         adopted     Unknown/Adopted Father         adopted     Social History     Socioeconomic History     Marital status:      Spouse name: None     Number of children: 2     Years of education: None     Highest education level: None   Occupational History     Occupation: management    Social Needs     Financial resource strain: None     Food insecurity:     Worry: None     Inability: None     Transportation needs:     Medical: None     Non-medical: None   Tobacco Use     Smoking status: Former Smoker     Packs/day: 0.80     Years: 25.00     Pack years: 20.00     Types: Cigarettes      "Last attempt to quit: 1/15/2014     Years since quittin.4     Smokeless tobacco: Never Used   Substance and Sexual Activity     Alcohol use: Yes     Alcohol/week: 1.8 oz     Types: 3 Standard drinks or equivalent per week     Comment: social     Drug use: No     Sexual activity: Never   Lifestyle     Physical activity:     Days per week: None     Minutes per session: None     Stress: None   Relationships     Social connections:     Talks on phone: None     Gets together: None     Attends Yazdanism service: None     Active member of club or organization: None     Attends meetings of clubs or organizations: None     Relationship status: None     Intimate partner violence:     Fear of current or ex partner: None     Emotionally abused: None     Physically abused: None     Forced sexual activity: None   Other Topics Concern     Parent/sibling w/ CABG, MI or angioplasty before 65F 55M? Not Asked   Social History Narrative     None       Current Outpatient Medications:      nicotine polacrilex ([employer] NICOTINE POLACRILEX) 2 MG gum, Place 1 each (2 mg) inside cheek as needed for smoking cessation As directed by physician instructions, Disp: 240 tablet, Rfl: 8  Allergies   Allergen Reactions     Nkda [No Known Drug Allergies]        Past medical, surgical, social and family history were reviewed and updated in EPIC.  H/o Tobacco Use: yes, quit in     ROS:  12 point review of systems negative other than symptoms noted below.      EXAM:  /70 (BP Location: Left arm, Patient Position: Chair, Cuff Size: Adult Regular)   Ht 1.702 m (5' 7\")   Wt 78.9 kg (174 lb)   LMP 2007   Breastfeeding? No   BMI 27.25 kg/m     BMI: Body mass index is 27.25 kg/m .  Constitutional: healthy, alert and no distress  Head: Normocephalic. No masses, lesions, tenderness or abnormalities  Neck: Neck supple. Trachea midline. No adenopathy. Thyroid symmetric, normal size.   Cardiovascular: RRR.   Respiratory: Negative.   Breast: No " nodularity, asymmetry or nipple discharge bilaterally.  Gastrointestinal: Abdomen soft, non-tender, non-distended. No masses, organomegaly  Vulva:  No external lesions, normal female hair distribution, no inguinal adenopathy.    Urethra:  Midline, non-tender, well supported, no discharge  Vagina:  Atrophic, no abnormal discharge, no lesions  Cervix: no lesions, no discharge  Uterus:  anteverted, smooth contour, without enlargement, mobile, and without tenderness  Ovaries:  No masses appreciated, non-tender, mobile  Rectal Exam: no external hemorrhoids noted  Musculoskeletal: extremities normal  Skin: no suspicious lesions or rashes  Psychiatric: Affect appropriate, cooperative, mentation appears normal.     COUNSELING:   Reviewed preventive health counseling, as reflected in patient instructions   reports that she quit smoking about 5 years ago. Her smoking use included cigarettes. She has a 20.00 pack-year smoking history. She has never used smokeless tobacco.        FRAX Risk Assessment  ASSESSMENT:  59 year old  with satisfactory annual exam  (Z00.00) Wellness examination  (primary encounter diagnosis)  Comment: Pap co-testing due   Plan: **TSH with free T4 reflex FUTURE anytime, Lipid        panel reflex to direct LDL Fasting,         **Comprehensive metabolic panel FUTURE 1yr    Lipid testing every 5 years for 45 years of age or older, every 3-5 years for 65 years of age and older     Munira Nicholson, DO  OB/GYN

## 2019-06-26 NOTE — NURSING NOTE
"Chief Complaint   Patient presents with     Physical       Initial /70 (BP Location: Left arm, Patient Position: Chair, Cuff Size: Adult Regular)   Ht 1.702 m (5' 7\")   Wt 78.9 kg (174 lb)   LMP 2007   Breastfeeding? No   BMI 27.25 kg/m   Estimated body mass index is 27.25 kg/m  as calculated from the following:    Height as of this encounter: 1.702 m (5' 7\").    Weight as of this encounter: 78.9 kg (174 lb).  BP completed using cuff size: regular    Questioned patient about current smoking habits.  Pt. quit smoking some time ago.          The following HM Due: NONE    Cassie Melendez CMA      "

## 2019-10-02 ENCOUNTER — HEALTH MAINTENANCE LETTER (OUTPATIENT)
Age: 59
End: 2019-10-02

## 2020-01-24 ENCOUNTER — VIRTUAL VISIT (OUTPATIENT)
Dept: FAMILY MEDICINE | Facility: OTHER | Age: 60
End: 2020-01-24

## 2020-01-24 NOTE — PROGRESS NOTES
"Date: 2020 09:29:44  Clinician: India Álvarez  Clinician NPI: [id number]  Patient: Afua Ruby  Patient : 1960  Patient Address: [address] [place] [id number]  Patient Phone: [phone]  Visit Protocol: UTI  Patient Summary:  Afua is a 60 year old ( : 1960 ) female who initiated a Visit for a presumed bladder infection. When asked the question \"Please sign me up to receive news, health information and promotions from [employer].\", Afua responded \"No\".   Her symptoms started 1-3 days ago and consist of dysuria, urinary frequency, and urgency.   Symptom details   Urine color: Her urine does not have any color.    Denied symptoms include foul-smelling urine, nausea, feeling as if the bladder is never empty, flank pain, abdominal pain, chills, vaginal discharge, urinary incontinence, vomiting, and vaginal itching. She does not feel feverish.   Afua has not used any over-the-counter medications or home remedies to relieve her current symptoms.  Precipitating events  Afua denies having a sexually transmitted disease.  Pertinent medical history  Afua has had a bladder infection before but has not had any in the past 12 months. Her current symptoms are similar to her previous bladder infection symptoms.   She is not sure what antibiotics have been effective in treating her past bladder infections.   Afua has not been prescribed antibiotics to prevent frequent or repeated bladder infections in the past and does not get yeast infections when she takes antibiotics. She has not experienced problems or side effects with any of the common antibiotics used to treat bladder infections.   Afua does not have a history of kidney stones. She has not used a catheter or been a patient in a hospital or nursing home in the past 2 weeks.   Afua does not smoke or use smokeless tobacco.     MEDICATIONS: No current medications, ALLERGIES: NKDA  Clinician Response:  Dear Afua,  Based on the information you have " provided, you likely have an acute urinary tract infection, also called a bladder infection. Bladder infections occur when bacteria from the outside of the body enters the urinary tract. Any part of the urinary system can be infected, but the bladder is the most common.  Medication information  I am prescribing:     Cephalexin (Keflex) 500 mg oral capsule. Take 1 capsule by mouth every 12 hours for 7 days. There are no refills with this prescription.   The medication I prescribed for your bladder infection is an antibiotic. Continue taking the medication until it is gone even if you feel better. If you get an upset stomach while taking antibiotics, taking the medication with food can help.   Yeast infections can be a common side effect of antibiotics. The most common symptom of a yeast infection is itchiness in and around the vagina. Other signs and symptoms include burning, redness, or a thick, white vaginal discharge that looks like cottage cheese and does not have a bad smell.  Self care  Urination helps to flush bacteria from the urinary tract. For this reason, drinking water and urinating often helps relieve some urinary symptoms and can decrease your risk of getting bladder infections in the future.  Other steps you can take to prevent future bladder infections include:     Wipe front to back after using the bathroom    Urinate after sexual intercourse    Avoid using deodorant sprays, douches, or powders in the vaginal area     When to seek care  Please make an appointment to be seen in a clinic or urgent care if any of the following occur:     You develop new symptoms or your symptoms become worse    You have medication side effects that make it difficult to take them as prescribed    Your symptoms do not improve within 1-2 days of starting treatment    You have symptoms of a bladder infection that return shortly after completing treatment     It is possible to have an allergic reaction to an antibiotic even  if you have not had one in the past. If you notice a new rash, significant swelling, or difficulty breathing, stop taking this medication immediately and go to a clinic or urgent care.   Diagnosis: Acute uncomplicated bladder infection  Diagnosis ICD: N39.0  Prescription: cephalexin (Keflex) 500 mg oral capsule 14 capsule, 7 days supply. Take 1 capsule by mouth every 12 hours for 7 days. Refills: 0, Refill as needed: no, Allow substitutions: yes  Pharmacy: [facility] #[id number] - [phone] - [address] [place] [address]

## 2021-01-15 ENCOUNTER — HEALTH MAINTENANCE LETTER (OUTPATIENT)
Age: 61
End: 2021-01-15

## 2021-03-20 ENCOUNTER — IMMUNIZATION (OUTPATIENT)
Dept: NURSING | Facility: CLINIC | Age: 61
End: 2021-03-20
Payer: COMMERCIAL

## 2021-03-20 PROCEDURE — 0001A PR COVID VAC PFIZER DIL RECON 30 MCG/0.3 ML IM: CPT

## 2021-03-20 PROCEDURE — 91300 PR COVID VAC PFIZER DIL RECON 30 MCG/0.3 ML IM: CPT

## 2021-04-10 ENCOUNTER — IMMUNIZATION (OUTPATIENT)
Dept: NURSING | Facility: CLINIC | Age: 61
End: 2021-04-10
Attending: INTERNAL MEDICINE
Payer: COMMERCIAL

## 2021-04-10 PROCEDURE — 0002A PR COVID VAC PFIZER DIL RECON 30 MCG/0.3 ML IM: CPT

## 2021-04-10 PROCEDURE — 91300 PR COVID VAC PFIZER DIL RECON 30 MCG/0.3 ML IM: CPT

## 2021-06-01 ENCOUNTER — OFFICE VISIT (OUTPATIENT)
Dept: INTERNAL MEDICINE | Facility: CLINIC | Age: 61
End: 2021-06-01
Payer: COMMERCIAL

## 2021-06-01 ENCOUNTER — ANCILLARY PROCEDURE (OUTPATIENT)
Dept: GENERAL RADIOLOGY | Facility: CLINIC | Age: 61
End: 2021-06-01
Attending: INTERNAL MEDICINE
Payer: COMMERCIAL

## 2021-06-01 VITALS
DIASTOLIC BLOOD PRESSURE: 80 MMHG | WEIGHT: 181.9 LBS | BODY MASS INDEX: 28.55 KG/M2 | SYSTOLIC BLOOD PRESSURE: 114 MMHG | HEIGHT: 67 IN | TEMPERATURE: 96 F | HEART RATE: 74 BPM | OXYGEN SATURATION: 100 %

## 2021-06-01 DIAGNOSIS — M25.561 ACUTE PAIN OF RIGHT KNEE: ICD-10-CM

## 2021-06-01 DIAGNOSIS — M25.561 ACUTE PAIN OF RIGHT KNEE: Primary | ICD-10-CM

## 2021-06-01 PROCEDURE — 99214 OFFICE O/P EST MOD 30 MIN: CPT | Performed by: INTERNAL MEDICINE

## 2021-06-01 PROCEDURE — 73560 X-RAY EXAM OF KNEE 1 OR 2: CPT | Mod: RT | Performed by: RADIOLOGY

## 2021-06-01 ASSESSMENT — MIFFLIN-ST. JEOR: SCORE: 1432.72

## 2021-06-01 NOTE — PATIENT INSTRUCTIONS
For Osteoarthritis:  1. Try taking 1000 mg (2 500 mg tablets) of tylenol/acetaminophen twice daily  And/or  2. Diclofenac 1% gel- apply 3x per day directly to knee

## 2021-06-01 NOTE — NURSING NOTE
"Chief Complaint   Patient presents with     Knee Pain     R knee pain     /80   Pulse 74   Temp 96  F (35.6  C) (Temporal)   Ht 1.702 m (5' 7\")   Wt 82.5 kg (181 lb 14.4 oz)   LMP 06/25/2007   SpO2 100%   BMI 28.49 kg/m   Estimated body mass index is 28.49 kg/m  as calculated from the following:    Height as of this encounter: 1.702 m (5' 7\").    Weight as of this encounter: 82.5 kg (181 lb 14.4 oz).        Health Maintenance due pending provider review:  Mammogram    Scheduled for mammo 06/09/2021    Maria E Perez CMA  "

## 2021-06-01 NOTE — PROGRESS NOTES
"    Assessment & Plan     Acute pain of right knee  Mild osteoarthritis noted on films by seem quite minimal when compared to her daily symptoms.  No sign of any other nonmusculoskeletal causes.?  Popliteal tendinopathy vs OA?   Have her see sports medicine if simple measures such as Tylenol and/or local diclofenac topically does not help.   - XR Knee Standing Right 2 Views; Future    Review of the result(s) of each unique test - xray  Ordering of each unique test  I spent a total of 32 minutes on the day of the visit.   Time spent doing chart review, history and exam, documentation and further activities per the note       BMI:   Estimated body mass index is 28.49 kg/m  as calculated from the following:    Height as of this encounter: 1.702 m (5' 7\").    Weight as of this encounter: 82.5 kg (181 lb 14.4 oz).       See Patient Instructions    Return in about 1 month (around 7/1/2021) for Preventative Visit.    Reji Jordan MD  [facility]   Afua is a 61 year old who presents for the following health issues     HPI     Musculoskeletal problem/pain  Onset/Duration: 1+ year  Description  Location: knee - right  Joint Swelling: no  Redness: no  Pain: no  Warmth: no  Intensity:  mild, moderate  Progression of Symptoms:  intermittent  Accompanying signs and symptoms:   Fevers: no  Numbness/tingling/weakness: no  History  Trauma to the area: no  Recent illness:  no  Previous similar problem: no  Previous evaluation:  no  Precipitating or alleviating factors:  Aggravating factors include: standing, walking and climbing stairs  Therapies tried and outcome: nothing        Review of Systems         Objective    /80   Pulse 74   Temp 96  F (35.6  C) (Temporal)   Ht 1.702 m (5' 7\")   Wt 82.5 kg (181 lb 14.4 oz)   LMP 06/25/2007   SpO2 100%   BMI 28.49 kg/m    Body mass index is 28.49 kg/m .  Physical Exam   GENERAL APPEARANCE: alert and no distress  MS: " extremities normal in appearance- no gross deformities noted.  Right knee normal flexion extension without tenderness.  No effusion noted.  Palpation of the posterior fossa without any palpable pulsatile abnormalities/masses, tenderness or fullness.    Mild joint space narrowing on films

## 2021-06-02 ENCOUNTER — MYC MEDICAL ADVICE (OUTPATIENT)
Dept: INTERNAL MEDICINE | Facility: CLINIC | Age: 61
End: 2021-06-02

## 2021-06-02 DIAGNOSIS — M25.561 ACUTE PAIN OF RIGHT KNEE: Primary | ICD-10-CM

## 2021-06-08 ENCOUNTER — ANCILLARY PROCEDURE (OUTPATIENT)
Dept: MAMMOGRAPHY | Facility: CLINIC | Age: 61
End: 2021-06-08
Payer: COMMERCIAL

## 2021-06-08 DIAGNOSIS — Z12.31 VISIT FOR SCREENING MAMMOGRAM: ICD-10-CM

## 2021-06-08 PROCEDURE — 77063 BREAST TOMOSYNTHESIS BI: CPT | Mod: TC | Performed by: RADIOLOGY

## 2021-06-08 PROCEDURE — 77067 SCR MAMMO BI INCL CAD: CPT | Mod: TC | Performed by: RADIOLOGY

## 2021-07-05 ENCOUNTER — OFFICE VISIT (OUTPATIENT)
Dept: INTERNAL MEDICINE | Facility: CLINIC | Age: 61
End: 2021-07-05
Payer: COMMERCIAL

## 2021-07-05 VITALS
HEIGHT: 67 IN | TEMPERATURE: 98 F | WEIGHT: 179.9 LBS | HEART RATE: 73 BPM | DIASTOLIC BLOOD PRESSURE: 72 MMHG | BODY MASS INDEX: 28.24 KG/M2 | OXYGEN SATURATION: 98 % | RESPIRATION RATE: 16 BRPM | SYSTOLIC BLOOD PRESSURE: 108 MMHG

## 2021-07-05 DIAGNOSIS — S83.206D TEAR OF MENISCUS OF RIGHT KNEE AS CURRENT INJURY, UNSPECIFIED MENISCUS, UNSPECIFIED TEAR TYPE, SUBSEQUENT ENCOUNTER: ICD-10-CM

## 2021-07-05 DIAGNOSIS — Z01.818 PREOP GENERAL PHYSICAL EXAM: Primary | ICD-10-CM

## 2021-07-05 LAB
ANION GAP SERPL CALCULATED.3IONS-SCNC: 2 MMOL/L (ref 3–14)
BUN SERPL-MCNC: 15 MG/DL (ref 7–30)
CALCIUM SERPL-MCNC: 9.7 MG/DL (ref 8.5–10.1)
CHLORIDE SERPL-SCNC: 105 MMOL/L (ref 94–109)
CO2 SERPL-SCNC: 31 MMOL/L (ref 20–32)
CREAT SERPL-MCNC: 1.12 MG/DL (ref 0.52–1.04)
GFR SERPL CREATININE-BSD FRML MDRD: 54 ML/MIN/{1.73_M2}
GLUCOSE SERPL-MCNC: 100 MG/DL (ref 70–99)
POTASSIUM SERPL-SCNC: 4.3 MMOL/L (ref 3.4–5.3)
SODIUM SERPL-SCNC: 138 MMOL/L (ref 133–144)

## 2021-07-05 PROCEDURE — 80048 BASIC METABOLIC PNL TOTAL CA: CPT | Performed by: INTERNAL MEDICINE

## 2021-07-05 PROCEDURE — 36415 COLL VENOUS BLD VENIPUNCTURE: CPT | Performed by: INTERNAL MEDICINE

## 2021-07-05 PROCEDURE — 99214 OFFICE O/P EST MOD 30 MIN: CPT | Performed by: INTERNAL MEDICINE

## 2021-07-05 ASSESSMENT — MIFFLIN-ST. JEOR: SCORE: 1423.65

## 2021-07-05 NOTE — PROGRESS NOTES
[address] [address]  Phone: [phone]  Primary Provider: Thelma Jordan  Pre-op Performing Provider: THELMA JORDAN    PREOPERATIVE EVALUATION:  Today's date: 7/5/2021    Afua Ruby is a 61 year old female who presents for a preoperative evaluation.    Surgical Information:  Surgery/Procedure: Meniscus repair right   Surgery Location: [facility]  Surgeon: Dr Shetty  Surgery Date: 07/16/2021  Time of Surgery: 745am  Where patient plans to recover: At home with family  Fax number for surgical facility: [phone]    Type of Anesthesia Anticipated: General    Assessment & Plan     The proposed surgical procedure is considered LOW risk.    Preop general physical exam  Tear of meniscus of right knee as current injury, unspecified meniscus, unspecified tear type, subsequent encounter    Risks and Recommendations:  The patient has the following additional risks and recommendations for perioperative complications:   - No identified additional risk factors other than previously addressed        RECOMMENDATION:  APPROVAL GIVEN to proceed with proposed procedure, without further diagnostic evaluation.    Review of prior external note(s) from - CareEverywhere information from ortho reviewed  Review of the result(s) of each unique test - renal                  Subjective     HPI related to upcoming procedure: R knee pain/injury.  menisus tear noted on imaging    Preop Questions 7/5/2021   1. Have you ever had a heart attack or stroke? No   2. Have you ever had surgery on your heart or blood vessels, such as a stent placement, a coronary artery bypass, or surgery on an artery in your head, neck, heart, or legs? No   3. Do you have chest pain with activity? No   4. Do you have a history of  heart failure? No   5. Do you currently have a cold, bronchitis or symptoms of other infection? No   6. Do you have a cough, shortness of breath, or  wheezing? No   7. Do you or anyone in your family have previous history of blood clots? No   8. Do you or does anyone in your family have a serious bleeding problem such as prolonged bleeding following surgeries or cuts? No   9. Have you ever had problems with anemia or been told to take iron pills? No   10. Have you had any abnormal blood loss such as black, tarry or bloody stools, or abnormal vaginal bleeding? No   11. Have you ever had a blood transfusion? No   12. Are you willing to have a blood transfusion if it is medically needed before, during, or after your surgery? Yes   13. Have you or any of your relatives ever had problems with anesthesia? No   14. Do you have sleep apnea, excessive snoring or daytime drowsiness? No   15. Do you have any artifical heart valves or other implanted medical devices like a pacemaker, defibrillator, or continuous glucose monitor? No   16. Do you have artificial joints? No   17. Are you allergic to latex? No   18. Is there any chance that you may be pregnant? No       Preoperative Review of :   reviewed - no record of controlled substances prescribed.    Review of Systems  CONSTITUTIONAL: NEGATIVE for fever, chills, change in weight  INTEGUMENTARY/SKIN: NEGATIVE for worrisome rashes, moles or lesions  EYES: NEGATIVE for vision changes or irritation  ENT/MOUTH: NEGATIVE for ear, mouth and throat problems  RESP: NEGATIVE for significant cough or SOB  BREAST: NEGATIVE for masses, tenderness or discharge  CV: NEGATIVE for chest pain, palpitations or peripheral edema  GI: NEGATIVE for nausea, abdominal pain, heartburn, or change in bowel habits  : NEGATIVE for frequency, dysuria, or hematuria  MUSCULOSKELETAL: NEGATIVE for significant arthralgias or myalgia  NEURO: NEGATIVE for weakness, dizziness or paresthesias  ENDOCRINE: NEGATIVE for temperature intolerance, skin/hair changes  HEME: NEGATIVE for bleeding problems  PSYCHIATRIC: NEGATIVE for changes in mood or  "affect    Patient Active Problem List    Diagnosis Date Noted     CARDIOVASCULAR SCREENING; LDL GOAL LESS THAN 130 2013     Priority: Medium      Past Medical History:   Diagnosis Date     Iron deficiency anemia     due to menorrhagia     Menorrhagia      Tobacco abuse      Past Surgical History:   Procedure Laterality Date     NO HISTORY OF SURGERY      oral for molars     TUBAL LIGATION       Current Outpatient Medications   Medication Sig Dispense Refill     nicotine polacrilex ([employer] NICOTINE POLACRILEX) 2 MG gum Place 1 each (2 mg) inside cheek as needed for smoking cessation As directed by physician instructions 240 tablet 8       Allergies   Allergen Reactions     Nkda [No Known Drug Allergies]         Social History     Tobacco Use     Smoking status: Former Smoker     Packs/day: 0.80     Years: 25.00     Pack years: 20.00     Types: Cigarettes     Quit date: 1/15/2014     Years since quittin.4     Smokeless tobacco: Never Used   Substance Use Topics     Alcohol use: Yes     Alcohol/week: 3.0 standard drinks     Types: 3 Standard drinks or equivalent per week     Comment: social       History   Drug Use No         Objective     /72   Pulse 73   Temp 98  F (36.7  C) (Tympanic)   Resp 16   Ht 1.702 m (5' 7\")   Wt 81.6 kg (179 lb 14.4 oz)   LMP 2007   SpO2 98%   BMI 28.18 kg/m      Physical Exam    GENERAL APPEARANCE: healthy, alert and no distress     EYES: EOMI, PERRL     HENT: nose and mouth without ulcers or lesions and normal cephalic/atraumatic     NECK: no adenopathy, no asymmetry, masses, or scars and thyroid normal to palpation     RESP: lungs clear to auscultation - no rales, rhonchi or wheezes     CV: regular rates and rhythm, normal S1 S2, no S3 or S4 and no murmur, click or rub     ABDOMEN:  soft, nontender, no HSM or masses and bowel sounds normal     MS: extremities normal- no gross deformities noted, no evidence of inflammation in joints, FROM in all " extremities.     SKIN: no suspicious lesions or rashes     NEURO: Normal strength and tone, sensory exam grossly normal, mentation intact and speech normal     PSYCH: mentation appears normal. and affect normal/bright     LYMPHATICS: No cervical adenopathy    Diagnostics:  Recent Results (from the past 24 hour(s))   Basic metabolic panel    Collection Time: 07/05/21  8:32 AM   Result Value Ref Range    Sodium 138 133 - 144 mmol/L    Potassium 4.3 3.4 - 5.3 mmol/L    Chloride 105 94 - 109 mmol/L    Carbon Dioxide 31 20 - 32 mmol/L    Anion Gap 2 (L) 3 - 14 mmol/L    Glucose 100 (H) 70 - 99 mg/dL    Urea Nitrogen 15 7 - 30 mg/dL    Creatinine 1.12 (H) 0.52 - 1.04 mg/dL    GFR Estimate 54 (L) >60 mL/min/[1.73_m2]    GFR Estimate If Black 62 >60 mL/min/[1.73_m2]    Calcium 9.7 8.5 - 10.1 mg/dL      Estimated Creatinine Clearance: 59.4 mL/min (A) (based on SCr of 1.12 mg/dL (H)).     No EKG required, no history of coronary heart disease, significant arrhythmia, peripheral arterial disease or other structural heart disease.    Revised Cardiac Risk Index (RCRI):  The patient has the following serious cardiovascular risks for perioperative complications:   - No serious cardiac risks = 0 points     RCRI Interpretation: 0 points: Class I (very low risk - 0.4% complication rate)       Signed Electronically by: Reji Jordan MD  Copy of this evaluation report is provided to requesting physician.

## 2021-07-09 ENCOUNTER — MYC MEDICAL ADVICE (OUTPATIENT)
Dept: INTERNAL MEDICINE | Facility: CLINIC | Age: 61
End: 2021-07-09

## 2021-09-05 ENCOUNTER — HEALTH MAINTENANCE LETTER (OUTPATIENT)
Age: 61
End: 2021-09-05

## 2022-01-10 ENCOUNTER — TELEPHONE (OUTPATIENT)
Dept: INTERNAL MEDICINE | Facility: CLINIC | Age: 62
End: 2022-01-10
Payer: COMMERCIAL

## 2022-01-10 NOTE — TELEPHONE ENCOUNTER
Pt calling in to report he is COVID. She took a home rapid test this morning and was positive.     Mallika Baum RN

## 2022-02-19 ENCOUNTER — HEALTH MAINTENANCE LETTER (OUTPATIENT)
Age: 62
End: 2022-02-19

## 2022-02-22 ENCOUNTER — MYC MEDICAL ADVICE (OUTPATIENT)
Dept: INTERNAL MEDICINE | Facility: CLINIC | Age: 62
End: 2022-02-22
Payer: COMMERCIAL

## 2022-04-22 ENCOUNTER — IMMUNIZATION (OUTPATIENT)
Dept: NURSING | Facility: CLINIC | Age: 62
End: 2022-04-22
Payer: COMMERCIAL

## 2022-04-22 PROCEDURE — 91306 COVID-19,PF,MODERNA (18+ YRS BOOSTER .25ML): CPT

## 2022-04-22 PROCEDURE — 0064A COVID-19,PF,MODERNA (18+ YRS BOOSTER .25ML): CPT

## 2022-06-16 ENCOUNTER — ANCILLARY PROCEDURE (OUTPATIENT)
Dept: MAMMOGRAPHY | Facility: CLINIC | Age: 62
End: 2022-06-16
Attending: INTERNAL MEDICINE
Payer: COMMERCIAL

## 2022-06-16 DIAGNOSIS — Z12.31 VISIT FOR SCREENING MAMMOGRAM: ICD-10-CM

## 2022-06-16 PROCEDURE — 77063 BREAST TOMOSYNTHESIS BI: CPT | Mod: TC | Performed by: RADIOLOGY

## 2022-06-16 PROCEDURE — 77067 SCR MAMMO BI INCL CAD: CPT | Mod: TC | Performed by: RADIOLOGY

## 2022-10-22 ENCOUNTER — HEALTH MAINTENANCE LETTER (OUTPATIENT)
Age: 62
End: 2022-10-22

## 2023-04-01 ENCOUNTER — HEALTH MAINTENANCE LETTER (OUTPATIENT)
Age: 63
End: 2023-04-01

## 2023-05-07 ENCOUNTER — OFFICE VISIT (OUTPATIENT)
Dept: URGENT CARE | Facility: URGENT CARE | Age: 63
End: 2023-05-07
Payer: COMMERCIAL

## 2023-05-07 VITALS
HEART RATE: 88 BPM | OXYGEN SATURATION: 99 % | WEIGHT: 180 LBS | SYSTOLIC BLOOD PRESSURE: 118 MMHG | DIASTOLIC BLOOD PRESSURE: 82 MMHG | RESPIRATION RATE: 16 BRPM | BODY MASS INDEX: 28.19 KG/M2 | TEMPERATURE: 97.9 F

## 2023-05-07 DIAGNOSIS — J20.8 ACUTE BRONCHITIS DUE TO OTHER SPECIFIED ORGANISMS: Primary | ICD-10-CM

## 2023-05-07 PROCEDURE — 99213 OFFICE O/P EST LOW 20 MIN: CPT | Performed by: INTERNAL MEDICINE

## 2023-05-07 RX ORDER — AZITHROMYCIN 250 MG/1
TABLET, FILM COATED ORAL
Qty: 6 TABLET | Refills: 0 | Status: SHIPPED | OUTPATIENT
Start: 2023-05-07 | End: 2023-05-12

## 2023-05-07 RX ORDER — CETIRIZINE HYDROCHLORIDE 10 MG/1
10 TABLET ORAL DAILY
Qty: 30 TABLET | Refills: 0 | Status: SHIPPED | OUTPATIENT
Start: 2023-05-07 | End: 2023-06-06

## 2023-05-07 NOTE — PROGRESS NOTES
Assessment & Plan     Acute bronchitis due to other specified organisms  - azithromycin (ZITHROMAX) 250 MG tablet; Take 2 tablets (500 mg) by mouth daily for 1 day, THEN 1 tablet (250 mg) daily for 4 days.  - cetirizine (ZYRTEC) 10 MG tablet; Take 1 tablet (10 mg) by mouth daily for 30 days    Jostin Montoya MD  [facility] MARIOLA Caal is a 63 year old, presenting for the following health issues:  Cough (Deep cough, chest congestion, little sinus congestion for about a month. )         View : No data to display.              HPI   Chief complaint of cough and fatigue.  Has been ill for about one month.  Cough productive of green sputum.  Some subjective feelings of fever.  Denies sore throat.  Not too much throat clearing. Denies nasal congestion.  Denies sinus pain/pressure.  Denies shortness of breath.   Feeling congested in the chest without specific wheeze.     Review of Systems   Constitutional, HEENT, cardiovascular, pulmonary, gi and gu systems are negative, except as otherwise noted.      Objective    /82 (BP Location: Right arm, Patient Position: Sitting, Cuff Size: Adult Regular)   Pulse 88   Temp 97.9  F (36.6  C) (Tympanic)   Resp 16   Wt 81.6 kg (180 lb)   LMP 06/25/2007   SpO2 99%   BMI 28.19 kg/m    Body mass index is 28.19 kg/m .  Physical Exam   GENERAL APPEARANCE: alert and no distress  HENT: ear canals and TM's normal and nose and mouth without ulcers or lesions  NECK: no adenopathy and no asymmetry, masses, or scars  RESP: lungs clear to auscultation - no rales, rhonchi or wheezes  CV: regular rates and rhythm, normal S1 S2, no S3 or S4 and no murmur, click or rub

## 2023-05-23 ENCOUNTER — PATIENT OUTREACH (OUTPATIENT)
Dept: CARE COORDINATION | Facility: CLINIC | Age: 63
End: 2023-05-23
Payer: COMMERCIAL

## 2023-06-24 ENCOUNTER — OFFICE VISIT (OUTPATIENT)
Dept: URGENT CARE | Facility: URGENT CARE | Age: 63
End: 2023-06-24
Payer: COMMERCIAL

## 2023-06-24 VITALS
WEIGHT: 180 LBS | DIASTOLIC BLOOD PRESSURE: 86 MMHG | TEMPERATURE: 97.5 F | HEART RATE: 72 BPM | SYSTOLIC BLOOD PRESSURE: 126 MMHG | OXYGEN SATURATION: 97 % | RESPIRATION RATE: 22 BRPM | BODY MASS INDEX: 28.19 KG/M2

## 2023-06-24 DIAGNOSIS — M71.30 SYNOVIAL CYST: Primary | ICD-10-CM

## 2023-06-24 PROCEDURE — 99212 OFFICE O/P EST SF 10 MIN: CPT | Performed by: FAMILY MEDICINE

## 2023-06-24 NOTE — PROGRESS NOTES
SUBJECTIVE: Afua Ruby is a 63 year old female presenting with a chief complaint of lump finger.  Onset of symptoms was week(s) ago.  Course of illness is same.    No pain/redness    Past Medical History:   Diagnosis Date     Iron deficiency anemia     due to menorrhagia     Menorrhagia      Tobacco abuse      Allergies   Allergen Reactions     Nkda [No Known Drug Allergy]      Social History     Tobacco Use     Smoking status: Former     Packs/day: 0.80     Years: 25.00     Pack years: 20.00     Types: Cigarettes     Quit date: 1/15/2014     Years since quittin.4     Smokeless tobacco: Never   Substance Use Topics     Alcohol use: Yes     Alcohol/week: 3.0 standard drinks of alcohol     Types: 3 Standard drinks or equivalent per week     Comment: social       ROS:  SKIN: no rash  GI: no vomiting    OBJECTIVE:  /86   Pulse 72   Temp 97.5  F (36.4  C)   Resp 22   Wt 81.6 kg (180 lb)   LMP 2007   SpO2 97%   BMI 28.19 kg/m  GENERAL APPEARANCE: healthy, alert and no distress  NEURO: Normal strength and tone, sensory exam grossly normal,  normal speech and mentation  SKIN: small >1cm cyst lateral finger without pain/redness      ICD-10-CM    1. Synovial cyst  M71.30         F/u Hand specialty if problematic  Fluids/Rest, f/u if worse/not any better

## 2023-06-27 ENCOUNTER — ANCILLARY PROCEDURE (OUTPATIENT)
Dept: MAMMOGRAPHY | Facility: CLINIC | Age: 63
End: 2023-06-27
Attending: INTERNAL MEDICINE
Payer: COMMERCIAL

## 2023-06-27 DIAGNOSIS — Z12.31 VISIT FOR SCREENING MAMMOGRAM: ICD-10-CM

## 2023-06-27 PROCEDURE — 77067 SCR MAMMO BI INCL CAD: CPT | Mod: TC | Performed by: STUDENT IN AN ORGANIZED HEALTH CARE EDUCATION/TRAINING PROGRAM

## 2023-06-27 PROCEDURE — 77063 BREAST TOMOSYNTHESIS BI: CPT | Mod: TC | Performed by: STUDENT IN AN ORGANIZED HEALTH CARE EDUCATION/TRAINING PROGRAM

## 2023-10-26 ENCOUNTER — OFFICE VISIT (OUTPATIENT)
Dept: URGENT CARE | Facility: URGENT CARE | Age: 63
End: 2023-10-26
Payer: COMMERCIAL

## 2023-10-26 VITALS
OXYGEN SATURATION: 97 % | SYSTOLIC BLOOD PRESSURE: 120 MMHG | RESPIRATION RATE: 18 BRPM | DIASTOLIC BLOOD PRESSURE: 84 MMHG | HEART RATE: 74 BPM | TEMPERATURE: 98.3 F

## 2023-10-26 DIAGNOSIS — J02.9 PHARYNGITIS, UNSPECIFIED ETIOLOGY: Primary | ICD-10-CM

## 2023-10-26 DIAGNOSIS — R07.0 THROAT PAIN: ICD-10-CM

## 2023-10-26 LAB — DEPRECATED S PYO AG THROAT QL EIA: NEGATIVE

## 2023-10-26 PROCEDURE — 99213 OFFICE O/P EST LOW 20 MIN: CPT | Performed by: PHYSICIAN ASSISTANT

## 2023-10-26 PROCEDURE — 87651 STREP A DNA AMP PROBE: CPT | Performed by: PHYSICIAN ASSISTANT

## 2023-10-26 RX ORDER — AMOXICILLIN 500 MG/1
500 CAPSULE ORAL 2 TIMES DAILY
Qty: 20 CAPSULE | Refills: 0 | Status: SHIPPED | OUTPATIENT
Start: 2023-10-26 | End: 2023-11-05

## 2023-10-26 NOTE — PROGRESS NOTES
Throat pain  - Streptococcus A Rapid Screen w/Reflex to PCR - Clinic Collect  - amoxicillin (AMOXIL) 500 MG capsule; Take 1 capsule (500 mg) by mouth 2 times daily for 10 days  - Group A Streptococcus PCR Throat Swab    Pharyngitis, unspecified etiology  - amoxicillin (AMOXIL) 500 MG capsule; Take 1 capsule (500 mg) by mouth 2 times daily for 10 days    Age 12 months or more  Okay to use Zarbee's   Okay to use Rx Children Tylenol if prescribed (Dose based on weight)    Age 2-12:   Okay to use Children Motrin or Tylenol over the counter.    Adults:  Okay to take acetaminophen 500 mg- 2 tabs (Total of 1000 mg) every 8 hrs   Okay to take ibuprofen 200 mg- 3 tabs (Total of 600 mg) every 6 hours        Okay to use Neti pot for sinus lavage up to three times daily for congestion and sinus pressure if present. Daily hot shower can be beneficial for congestion and body aches. Okay to use bedroom vaporizer or humidifier if symptoms are worse at night. Nightly Vicks Vapor rub and 5-10 mg of Melatonin okay to use for sleep.     Over the counter cough medication and decongestants okay if not prescribed by me during this visit. For homeopathic alternatives to cough syrup and decongestant, feel free to try Elderberry extract.    Okay to use salt water gargles, warm tea (or warm water with lemon and honey), and lozenges for any throat discomfort. Chloraseptic spray is also highly encourages for throat pain/irritation.     Patient will need to get plenty of rest and drink at least 1.5-2 liters of fluids daily for adults and 1-1.5 liters for children. If vomiting and not tolerating liquids for more than 24 hrs, please go to your nearest emergency department for IV fluids and further treatment.     Patient is not contagious after 1 week from start of symptoms. If possible, wear mask for first 7 days. Wash hands regularly and vigorously for 30 seconds often.     Patient was advised to return to clinic for reevaluation (either UC or  PCP) if symptoms do not improve in 7 days. Patient educated on red flag symptoms and asked to go directly to the ED if these symptoms present themselves.     SADAF Martinez [facility]    Subjective   63 year old who presents to clinic today for the following health issues:    Pharyngitis       HPI     Acute Illness  Acute illness concerns: Sore throat   Onset/Duration: Last night   Symptoms:  Fever: No  Chills/Sweats: No  Headache (location?): No  Sinus Pressure: No  Conjunctivitis:  No  Ear Pain: no  Rhinorrhea: No  Congestion: Mild  Sore Throat: YES  Cough: no  Wheeze: No  Decreased Appetite: No  Nausea: No  Vomiting: No  Diarrhea: No  Dysuria/Freq.: No  Dysuria or Hematuria: No  Fatigue/Achiness: No  Sick/Strep Exposure: Grand daughter has strep   Therapies tried and outcome: Tylenol     Review of Systems   Review of Systems   See HPI    Objective    Temp: 98.3  F (36.8  C)   BP: 120/84 Pulse: 74   Resp: 18 SpO2: 97 %       Physical Exam   Physical Exam  Constitutional:       General: She is not in acute distress.     Appearance: Normal appearance. She is normal weight. She is not ill-appearing, toxic-appearing or diaphoretic.   HENT:      Head: Normocephalic and atraumatic.      Right Ear: Tympanic membrane, ear canal and external ear normal. There is no impacted cerumen.      Left Ear: Tympanic membrane, ear canal and external ear normal. There is no impacted cerumen.      Nose: Nose normal. No congestion or rhinorrhea.      Mouth/Throat:      Mouth: Mucous membranes are moist.      Pharynx: Posterior oropharyngeal erythema present. No oropharyngeal exudate.   Cardiovascular:      Rate and Rhythm: Normal rate and regular rhythm.      Pulses: Normal pulses.      Heart sounds: Normal heart sounds. No murmur heard.     No friction rub. No gallop.   Pulmonary:      Effort: Pulmonary effort is normal. No respiratory distress.      Breath sounds: No stridor. No wheezing, rhonchi or rales.    Chest:      Chest wall: No tenderness.   Lymphadenopathy:      Cervical: No cervical adenopathy.   Neurological:      General: No focal deficit present.      Mental Status: She is alert and oriented to person, place, and time. Mental status is at baseline.      Gait: Gait normal.   Psychiatric:         Mood and Affect: Mood normal.         Behavior: Behavior normal.         Thought Content: Thought content normal.         Judgment: Judgment normal.          Results for orders placed or performed in visit on 10/26/23 (from the past 24 hour(s))   Streptococcus A Rapid Screen w/Reflex to PCR - Clinic Collect    Specimen: Throat; Swab   Result Value Ref Range    Group A Strep antigen Negative Negative

## 2023-10-27 LAB — GROUP A STREP BY PCR: NOT DETECTED

## 2024-04-02 ENCOUNTER — OFFICE VISIT (OUTPATIENT)
Dept: URGENT CARE | Facility: URGENT CARE | Age: 64
End: 2024-04-02
Payer: COMMERCIAL

## 2024-04-02 VITALS
TEMPERATURE: 97.2 F | DIASTOLIC BLOOD PRESSURE: 87 MMHG | BODY MASS INDEX: 28.69 KG/M2 | SYSTOLIC BLOOD PRESSURE: 128 MMHG | WEIGHT: 183.2 LBS | HEART RATE: 78 BPM | OXYGEN SATURATION: 99 % | RESPIRATION RATE: 14 BRPM

## 2024-04-02 DIAGNOSIS — J30.89 NON-SEASONAL ALLERGIC RHINITIS, UNSPECIFIED TRIGGER: ICD-10-CM

## 2024-04-02 DIAGNOSIS — J01.90 ACUTE SINUSITIS WITH COEXISTING CONDITION, NEED PROPHYLACTIC TREATMENT: Primary | ICD-10-CM

## 2024-04-02 PROCEDURE — 99214 OFFICE O/P EST MOD 30 MIN: CPT | Performed by: PHYSICIAN ASSISTANT

## 2024-04-02 RX ORDER — FLUCONAZOLE 150 MG/1
TABLET ORAL
Qty: 1 TABLET | Refills: 0 | Status: SHIPPED | OUTPATIENT
Start: 2024-04-02

## 2024-04-02 RX ORDER — CETIRIZINE HYDROCHLORIDE 10 MG/1
10 TABLET ORAL DAILY
Qty: 30 TABLET | Refills: 4 | Status: SHIPPED | OUTPATIENT
Start: 2024-04-02

## 2024-04-02 RX ORDER — FLUTICASONE PROPIONATE 50 MCG
2 SPRAY, SUSPENSION (ML) NASAL DAILY
Qty: 18.2 ML | Refills: 4 | Status: SHIPPED | OUTPATIENT
Start: 2024-04-02

## 2024-04-02 NOTE — PATIENT INSTRUCTIONS
(J01.90) Acute sinusitis with coexisting condition, need prophylactic treatment  (primary encounter diagnosis)  Comment:   Plan: fluticasone (FLONASE) 50 MCG/ACT nasal spray,         amoxicillin-clavulanate (AUGMENTIN) 875-125 MG         tablet,     Take probiotic while on antibiotics.    Take fluconazole (DIFLUCAN) 150 MG tablet after finishing antibiotic, to treat potential yeast infection.            (J30.89) Non-seasonal allergic rhinitis, unspecified trigger  Comment:   Plan: cetirizine (ZYRTEC) 10 MG tablet        Flonase 2 sprays each nostril at bedtime every night, use for minimum of 2 weeks consider using for the next month.  Cetirizine nightly for the next 4 to 6 weeks.    Saline nasal spray as needed to flush out nasal passages and keep it moist.    Tylenol or ibuprofen as needed for pain.

## 2024-04-02 NOTE — PROGRESS NOTES
Patient presents with:  Urgent Care: Pt presents with a possible sinus infection onset 1 week.    (J01.90) Acute sinusitis with coexisting condition, need prophylactic treatment  (primary encounter diagnosis)  Comment:   Plan: fluticasone (FLONASE) 50 MCG/ACT nasal spray,         amoxicillin-clavulanate (AUGMENTIN) 875-125 MG         tablet,     Take probiotic while on antibiotics.    Take fluconazole (DIFLUCAN) 150 MG tablet after finishing antibiotic, to treat potential yeast infection.            (J30.89) Non-seasonal allergic rhinitis, unspecified trigger  Comment:   Plan: cetirizine (ZYRTEC) 10 MG tablet        Flonase 2 sprays each nostril at bedtime every night, use for minimum of 2 weeks consider using for the next month.  Cetirizine nightly for the next 4 to 6 weeks.    Saline nasal spray as needed to flush out nasal passages and keep it moist.    Tylenol or ibuprofen as needed for pain.    At the end of the encounter, I discussed results, diagnosis, medications. Discussed red flags for immediate return to clinic/ER, as well as indications for follow up if no improvement. Patient understood and agreed to plan. Patient was stable for discharge     If not improving or if condition worsens, follow up with your Primary Care Provider        SUBJECTIVE:   Afua Ruby is a 64 year old female who presents today with sinus congestion for about 10 days, now with sinus pressure recently and today a sinus headache and purulent drainage.      Some sneezing initially.      Subjective fevers.      Some cough.      Patient Active Problem List   Diagnosis    CARDIOVASCULAR SCREENING; LDL GOAL LESS THAN 130         Past Medical History:   Diagnosis Date    Iron deficiency anemia 2007    due to menorrhagia    Menorrhagia 2007    Tobacco abuse          Current Outpatient Medications   Medication Sig Dispense Refill    Multiple Vitamins-Iron (DAILY-MASON/IRON/BETA-CAROTENE) TABS TAKE 1 TABLET BY MOUTH DAILY. (Patient not  taking: Reported on 10/19/2020) 30 tablet 7     Social History     Tobacco Use    Smoking status: Never Smoker    Smokeless tobacco: Never Used   Substance Use Topics    Alcohol use: Not on file     Family History   Problem Relation Age of Onset    Diabetes Mother     Diabetes Father          ROS:    10 point ROS of systems including Constitutional, Eyes, Respiratory, Cardiovascular, Gastroenterology, Genitourinary, Integumentary, Muscularskeletal, Psychiatric ,neurological were all negative except for pertinent positives noted in my HPI       OBJECTIVE:  /87   Pulse 78   Temp 97.2  F (36.2  C) (Tympanic)   Resp 14   Wt 83.1 kg (183 lb 3.2 oz)   LMP 06/25/2007   SpO2 99%   BMI 28.69 kg/m    Physical Exam:  GENERAL APPEARANCE: healthy, alert and no distress  EYES: EOMI,  PERRL, conjunctiva clear  HENT: ear canals and TM's normal.  Nose and mouth without ulcers, erythema or lesions  HENT: nasal turbinates boggy with bluish hue and rhinorrhea green  NECK: supple, nontender, no lymphadenopathy  RESP: lungs clear to auscultation - no rales, rhonchi or wheezes  CV: regular rates and rhythm, normal S1 S2, no murmur noted  SKIN: no suspicious lesions or rashes

## 2024-04-19 NOTE — PATIENT INSTRUCTIONS

## 2024-05-28 ENCOUNTER — PATIENT OUTREACH (OUTPATIENT)
Dept: CARE COORDINATION | Facility: CLINIC | Age: 64
End: 2024-05-28
Payer: COMMERCIAL

## 2024-06-02 ENCOUNTER — HEALTH MAINTENANCE LETTER (OUTPATIENT)
Age: 64
End: 2024-06-02

## 2024-06-25 ENCOUNTER — PATIENT OUTREACH (OUTPATIENT)
Dept: CARE COORDINATION | Facility: CLINIC | Age: 64
End: 2024-06-25
Payer: COMMERCIAL

## 2024-06-27 ENCOUNTER — ANCILLARY PROCEDURE (OUTPATIENT)
Dept: MAMMOGRAPHY | Facility: CLINIC | Age: 64
End: 2024-06-27
Attending: INTERNAL MEDICINE
Payer: COMMERCIAL

## 2024-06-27 DIAGNOSIS — Z12.31 VISIT FOR SCREENING MAMMOGRAM: ICD-10-CM

## 2024-06-27 PROCEDURE — 77063 BREAST TOMOSYNTHESIS BI: CPT | Mod: TC | Performed by: RADIOLOGY

## 2024-06-27 PROCEDURE — 77067 SCR MAMMO BI INCL CAD: CPT | Mod: TC | Performed by: RADIOLOGY

## 2025-03-29 ENCOUNTER — OFFICE VISIT (OUTPATIENT)
Dept: URGENT CARE | Facility: URGENT CARE | Age: 65
End: 2025-03-29
Payer: COMMERCIAL

## 2025-03-29 VITALS
HEART RATE: 89 BPM | SYSTOLIC BLOOD PRESSURE: 116 MMHG | TEMPERATURE: 97.6 F | RESPIRATION RATE: 16 BRPM | DIASTOLIC BLOOD PRESSURE: 77 MMHG | OXYGEN SATURATION: 100 %

## 2025-03-29 DIAGNOSIS — J30.2 SEASONAL ALLERGIC RHINITIS, UNSPECIFIED TRIGGER: Primary | ICD-10-CM

## 2025-03-29 DIAGNOSIS — R09.81 NASAL CONGESTION: ICD-10-CM

## 2025-03-29 PROCEDURE — 99213 OFFICE O/P EST LOW 20 MIN: CPT | Performed by: NURSE PRACTITIONER

## 2025-03-29 RX ORDER — CETIRIZINE HYDROCHLORIDE, PSEUDOEPHEDRINE HYDROCHLORIDE 5; 120 MG/1; MG/1
1 TABLET, FILM COATED, EXTENDED RELEASE ORAL 2 TIMES DAILY
Qty: 60 TABLET | Refills: 0 | Status: SHIPPED | OUTPATIENT
Start: 2025-03-29 | End: 2025-04-28

## 2025-03-29 NOTE — PROGRESS NOTES
ICD-10-CM    1. Seasonal allergic rhinitis, unspecified trigger  J30.2 cetirizine-pseudoePHEDrine ER (ZYRTEC-D) 5-120 MG 12 hr tablet      2. Nasal congestion  R09.81 cetirizine-pseudoePHEDrine ER (ZYRTEC-D) 5-120 MG 12 hr tablet      No signs of a sinus infection on exam.  Will treat for seasonal allergies with Zyrtec-D.  If this does not improve symptoms and 4 to 5 days suggested she may want to be seen again or submit an e-visit for possible antibiotic treatment of sinusitis.      Red flag warning signs and when to go to the emergency room discussed.  Reviewed potential adverse reactions to medications.    SUBJECTIVE:   Afua Ruby is a 65 year old female presenting with a chief complaint of   Chief Complaint   Patient presents with    Sinus Problem     Has had a cold for weeks and is now developing facial pressure and headaches    Primarily only symptom has been nasal congestion and runny nose.  There is been no fever, cough, chills, nausea vomiting, diarrhea, body aches or headaches.  She has not been around a baby that has been ill that she knows of.      OBJECTIVE  /77   Pulse 89   Temp 97.6  F (36.4  C) (Tympanic)   Resp 16   LMP 06/25/2007   SpO2 100%       GENERAL: Alert, mild distress  SKIN: skin is clear, no rash or abnormal pigmentation  HEAD: The head is normocephalic.   EYES: The eyes are normal. The conjunctivae and cornea normal.   NECK: The neck is supple and thyroid is normal, no masses; LYMPH NODES: No adenopathy  HENT: Bilateral tympanic membranes have small amount of fluid present, nasal passages have clear rhinorrhea, pharynx is mildly red  LUNGS: The lung fields are clear to auscultation, no rales, rhonchi, wheezing or retractions  CV: Rhythm is regular. S1 and S2 are normal. No murmurs.  EXTREMITIES: Symmetric extremities no deformities    JERICA Lou, CNP  [facility]    The use of Dragon/Dwolla dictation services may have been used to construct the  content in this note; any grammatical or spelling errors are non-intentional. Please contact the author of this note directly if you are in need of any clarification.

## 2025-04-27 ENCOUNTER — MYC REFILL (OUTPATIENT)
Dept: INTERNAL MEDICINE | Facility: CLINIC | Age: 65
End: 2025-04-27
Payer: COMMERCIAL

## 2025-04-27 DIAGNOSIS — J30.2 SEASONAL ALLERGIC RHINITIS, UNSPECIFIED TRIGGER: ICD-10-CM

## 2025-04-27 DIAGNOSIS — R09.81 NASAL CONGESTION: ICD-10-CM

## 2025-04-27 RX ORDER — CETIRIZINE HYDROCHLORIDE, PSEUDOEPHEDRINE HYDROCHLORIDE 5; 120 MG/1; MG/1
1 TABLET, FILM COATED, EXTENDED RELEASE ORAL 2 TIMES DAILY
Qty: 60 TABLET | Refills: 0 | Status: CANCELLED | OUTPATIENT
Start: 2025-04-27

## 2025-04-28 NOTE — TELEPHONE ENCOUNTER
Clinic RN: Please investigate patient's chart or contact patient if the information cannot be found because  Was from , but hasn't seen primary since 2021 it looks like.  Will need to est care  for refills

## 2025-04-28 NOTE — TELEPHONE ENCOUNTER
Triage Patient Outreach    Attempt # 1    Was call answered?  No.  Left voicemail to return call to Triage at Primary Clinic    Erin Overton RN

## 2025-05-01 RX ORDER — CETIRIZINE HYDROCHLORIDE, PSEUDOEPHEDRINE HYDROCHLORIDE 5; 120 MG/1; MG/1
1 TABLET, FILM COATED, EXTENDED RELEASE ORAL 2 TIMES DAILY
Qty: 60 TABLET | Refills: 0 | OUTPATIENT
Start: 2025-05-01

## 2025-05-28 ENCOUNTER — PATIENT OUTREACH (OUTPATIENT)
Dept: CARE COORDINATION | Facility: CLINIC | Age: 65
End: 2025-05-28
Payer: COMMERCIAL

## 2025-06-15 ENCOUNTER — HEALTH MAINTENANCE LETTER (OUTPATIENT)
Age: 65
End: 2025-06-15